# Patient Record
Sex: FEMALE | Race: OTHER | HISPANIC OR LATINO | Employment: UNEMPLOYED | URBAN - METROPOLITAN AREA
[De-identification: names, ages, dates, MRNs, and addresses within clinical notes are randomized per-mention and may not be internally consistent; named-entity substitution may affect disease eponyms.]

---

## 2023-06-05 ENCOUNTER — HOSPITAL ENCOUNTER (EMERGENCY)
Facility: HOSPITAL | Age: 53
Discharge: HOME | End: 2023-06-05
Attending: EMERGENCY MEDICINE | Admitting: EMERGENCY MEDICINE
Payer: COMMERCIAL

## 2023-06-05 ENCOUNTER — APPOINTMENT (EMERGENCY)
Dept: CT IMAGING | Facility: HOSPITAL | Age: 53
End: 2023-06-05
Payer: COMMERCIAL

## 2023-06-05 VITALS
RESPIRATION RATE: 16 BRPM | SYSTOLIC BLOOD PRESSURE: 104 MMHG | WEIGHT: 111.8 LBS | OXYGEN SATURATION: 97 % | TEMPERATURE: 98.3 F | HEART RATE: 72 BPM | DIASTOLIC BLOOD PRESSURE: 75 MMHG

## 2023-06-05 DIAGNOSIS — K62.89 PROCTITIS: ICD-10-CM

## 2023-06-05 DIAGNOSIS — K52.9 COLITIS: ICD-10-CM

## 2023-06-05 DIAGNOSIS — G89.18 POST-OPERATIVE PAIN: Primary | ICD-10-CM

## 2023-06-05 LAB
ALBUMIN SERPL BCP-MCNC: 4.1 G/DL (ref 3.5–5)
ALP SERPL-CCNC: 88 U/L (ref 34–104)
ALT SERPL W P-5'-P-CCNC: 12 U/L (ref 7–52)
ANION GAP SERPL CALCULATED.3IONS-SCNC: 11 MMOL/L (ref 4–13)
AST SERPL W P-5'-P-CCNC: 24 U/L (ref 13–39)
BACTERIA UR QL AUTO: NORMAL /HPF
BASOPHILS # BLD AUTO: 0.05 THOUSANDS/ÂΜL (ref 0–0.1)
BASOPHILS NFR BLD AUTO: 1 % (ref 0–1)
BILIRUB SERPL-MCNC: 0.33 MG/DL (ref 0.2–1)
BILIRUB UR QL STRIP: NEGATIVE
BUN SERPL-MCNC: 15 MG/DL (ref 5–25)
CALCIUM SERPL-MCNC: 9.8 MG/DL (ref 8.4–10.2)
CHLORIDE SERPL-SCNC: 105 MMOL/L (ref 96–108)
CLARITY UR: CLEAR
CO2 SERPL-SCNC: 25 MMOL/L (ref 21–32)
COLOR UR: YELLOW
CREAT SERPL-MCNC: 0.69 MG/DL (ref 0.6–1.3)
EOSINOPHIL # BLD AUTO: 0.02 THOUSAND/ÂΜL (ref 0–0.61)
EOSINOPHIL NFR BLD AUTO: 0 % (ref 0–6)
ERYTHROCYTE [DISTWIDTH] IN BLOOD BY AUTOMATED COUNT: 15 % (ref 11.6–15.1)
GFR SERPL CREATININE-BSD FRML MDRD: 100 ML/MIN/1.73SQ M
GLUCOSE SERPL-MCNC: 100 MG/DL (ref 65–140)
GLUCOSE UR STRIP-MCNC: NEGATIVE MG/DL
HCT VFR BLD AUTO: 34.9 % (ref 34.8–46.1)
HGB BLD-MCNC: 11.2 G/DL (ref 11.5–15.4)
HGB UR QL STRIP.AUTO: NEGATIVE
IMM GRANULOCYTES # BLD AUTO: 0.02 THOUSAND/UL (ref 0–0.2)
IMM GRANULOCYTES NFR BLD AUTO: 0 % (ref 0–2)
KETONES UR STRIP-MCNC: NEGATIVE MG/DL
LEUKOCYTE ESTERASE UR QL STRIP: 25
LIPASE SERPL-CCNC: 19 U/L (ref 11–82)
LYMPHOCYTES # BLD AUTO: 2.45 THOUSANDS/ÂΜL (ref 0.6–4.47)
LYMPHOCYTES NFR BLD AUTO: 32 % (ref 14–44)
MCH RBC QN AUTO: 29.6 PG (ref 26.8–34.3)
MCHC RBC AUTO-ENTMCNC: 32.1 G/DL (ref 31.4–37.4)
MCV RBC AUTO: 92 FL (ref 82–98)
MONOCYTES # BLD AUTO: 0.59 THOUSAND/ÂΜL (ref 0.17–1.22)
MONOCYTES NFR BLD AUTO: 8 % (ref 4–12)
NEUTROPHILS # BLD AUTO: 4.51 THOUSANDS/ÂΜL (ref 1.85–7.62)
NEUTS SEG NFR BLD AUTO: 59 % (ref 43–75)
NITRITE UR QL STRIP: NEGATIVE
NON-SQ EPI CELLS URNS QL MICRO: NORMAL /HPF
NRBC BLD AUTO-RTO: 0 /100 WBCS
PH UR STRIP.AUTO: 5 [PH]
PLATELET # BLD AUTO: 472 THOUSANDS/UL (ref 149–390)
PMV BLD AUTO: 9.3 FL (ref 8.9–12.7)
POTASSIUM SERPL-SCNC: 4.1 MMOL/L (ref 3.5–5.3)
PROT SERPL-MCNC: 7.8 G/DL (ref 6.4–8.4)
PROT UR STRIP-MCNC: ABNORMAL MG/DL
RBC # BLD AUTO: 3.79 MILLION/UL (ref 3.81–5.12)
RBC #/AREA URNS AUTO: NORMAL /HPF
SODIUM SERPL-SCNC: 141 MMOL/L (ref 135–147)
SP GR UR STRIP.AUTO: 1.01 (ref 1–1.04)
UROBILINOGEN UA: NEGATIVE MG/DL
WBC # BLD AUTO: 7.64 THOUSAND/UL (ref 4.31–10.16)
WBC #/AREA URNS AUTO: NORMAL /HPF

## 2023-06-05 PROCEDURE — 83690 ASSAY OF LIPASE: CPT | Performed by: EMERGENCY MEDICINE

## 2023-06-05 PROCEDURE — 74177 CT ABD & PELVIS W/CONTRAST: CPT

## 2023-06-05 PROCEDURE — 36415 COLL VENOUS BLD VENIPUNCTURE: CPT | Performed by: EMERGENCY MEDICINE

## 2023-06-05 PROCEDURE — 80053 COMPREHEN METABOLIC PANEL: CPT | Performed by: EMERGENCY MEDICINE

## 2023-06-05 PROCEDURE — G1004 CDSM NDSC: HCPCS

## 2023-06-05 PROCEDURE — 81003 URINALYSIS AUTO W/O SCOPE: CPT | Performed by: EMERGENCY MEDICINE

## 2023-06-05 PROCEDURE — 81001 URINALYSIS AUTO W/SCOPE: CPT | Performed by: EMERGENCY MEDICINE

## 2023-06-05 PROCEDURE — 85025 COMPLETE CBC W/AUTO DIFF WBC: CPT | Performed by: EMERGENCY MEDICINE

## 2023-06-05 RX ORDER — ONDANSETRON 2 MG/ML
4 INJECTION INTRAMUSCULAR; INTRAVENOUS ONCE
Status: COMPLETED | OUTPATIENT
Start: 2023-06-05 | End: 2023-06-05

## 2023-06-05 RX ORDER — IBUPROFEN 600 MG/1
600 TABLET ORAL EVERY 6 HOURS PRN
Qty: 30 TABLET | Refills: 0 | Status: SHIPPED | OUTPATIENT
Start: 2023-06-05

## 2023-06-05 RX ORDER — KETOROLAC TROMETHAMINE 30 MG/ML
15 INJECTION, SOLUTION INTRAMUSCULAR; INTRAVENOUS ONCE
Status: COMPLETED | OUTPATIENT
Start: 2023-06-05 | End: 2023-06-05

## 2023-06-05 RX ORDER — ACETAMINOPHEN 325 MG/1
650 TABLET ORAL EVERY 6 HOURS PRN
Qty: 30 TABLET | Refills: 0 | Status: SHIPPED | OUTPATIENT
Start: 2023-06-05

## 2023-06-05 RX ORDER — ACETAMINOPHEN 325 MG/1
650 TABLET ORAL ONCE
Status: COMPLETED | OUTPATIENT
Start: 2023-06-05 | End: 2023-06-05

## 2023-06-05 RX ADMIN — IOHEXOL 100 ML: 350 INJECTION, SOLUTION INTRAVENOUS at 21:10

## 2023-06-05 RX ADMIN — SODIUM CHLORIDE 1000 ML: 0.9 INJECTION, SOLUTION INTRAVENOUS at 20:45

## 2023-06-05 RX ADMIN — KETOROLAC TROMETHAMINE 15 MG: 30 INJECTION, SOLUTION INTRAMUSCULAR; INTRAVENOUS at 20:13

## 2023-06-05 RX ADMIN — ONDANSETRON 4 MG: 2 INJECTION INTRAMUSCULAR; INTRAVENOUS at 20:45

## 2023-06-05 RX ADMIN — ACETAMINOPHEN 650 MG: 325 TABLET ORAL at 23:01
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
## 2023-06-09 ENCOUNTER — HOSPITAL ENCOUNTER (EMERGENCY)
Facility: HOSPITAL | Age: 53
Discharge: HOME | End: 2023-06-09
Attending: EMERGENCY MEDICINE | Admitting: EMERGENCY MEDICINE
Payer: COMMERCIAL

## 2023-06-09 VITALS
WEIGHT: 108.03 LBS | DIASTOLIC BLOOD PRESSURE: 83 MMHG | SYSTOLIC BLOOD PRESSURE: 164 MMHG | HEART RATE: 80 BPM | RESPIRATION RATE: 18 BRPM | OXYGEN SATURATION: 97 % | TEMPERATURE: 96.4 F

## 2023-06-09 DIAGNOSIS — G89.18 POSTOPERATIVE PAIN: Primary | ICD-10-CM

## 2023-06-09 PROCEDURE — 99283 EMERGENCY DEPT VISIT LOW MDM: CPT

## 2023-06-09 NOTE — ED NOTES
Pt heard arguing with provider and at this time walked out of room yelling at provider      Sydney Horne RN  06/09/23 9950
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
## 2023-06-09 NOTE — ED NOTES
Pt eloped from exam room after provider eval  Pt ambulated from ER w/ steady gait and no apparent distress, unlabored respirations w/ clear speech     Cyndy Cullen RN  06/09/23 7077

## 2023-09-08 ENCOUNTER — APPOINTMENT (EMERGENCY)
Dept: RADIOLOGY | Facility: HOSPITAL | Age: 53
End: 2023-09-08
Payer: COMMERCIAL

## 2023-09-08 ENCOUNTER — HOSPITAL ENCOUNTER (EMERGENCY)
Facility: HOSPITAL | Age: 53
Discharge: HOME | End: 2023-09-08
Attending: STUDENT IN AN ORGANIZED HEALTH CARE EDUCATION/TRAINING PROGRAM
Payer: COMMERCIAL

## 2023-09-08 VITALS
RESPIRATION RATE: 22 BRPM | TEMPERATURE: 98.5 F | OXYGEN SATURATION: 95 % | SYSTOLIC BLOOD PRESSURE: 157 MMHG | HEART RATE: 99 BPM | DIASTOLIC BLOOD PRESSURE: 90 MMHG

## 2023-09-08 DIAGNOSIS — Z00.8 MEDICAL CLEARANCE FOR INCARCERATION: Primary | ICD-10-CM

## 2023-09-08 PROCEDURE — 99284 EMERGENCY DEPT VISIT MOD MDM: CPT | Performed by: STUDENT IN AN ORGANIZED HEALTH CARE EDUCATION/TRAINING PROGRAM

## 2023-09-08 PROCEDURE — 74022 RADEX COMPL AQT ABD SERIES: CPT

## 2023-09-08 PROCEDURE — 99283 EMERGENCY DEPT VISIT LOW MDM: CPT

## 2023-09-08 NOTE — ED PROVIDER NOTES
History  Chief Complaint   Patient presents with   • Medical Problem     Pt arrived with corrections officers for medical clearance. Pt was given body scan in [facility] and further testing needs to be performed. Pt c/o chronic lower back pain. HPI     47 yr old female, past medical history of bipolar disorder, presenting to the ED from [address] with correctional officers for foreign body evaluation. Patient handcuffed in stretcher. Per correctional officers, patient allegedly may have put 2 ring objects in her vagina. Says there is " only soft tissue you guys are going to look dumb". Denies any ingestions or foreign body insertion. Patient is talking to herself, disorganized, intermittently verbally aggressive but redirectable. She adamantly refuses pelvic exam.      Prior to Admission Medications   Prescriptions Last Dose Informant Patient Reported? Taking?   acetaminophen (TYLENOL) 325 mg tablet   No No   Sig: Take 2 tablets (650 mg total) by mouth every 6 (six) hours as needed for mild pain   ibuprofen (MOTRIN) 600 mg tablet   No No   Sig: Take 1 tablet (600 mg total) by mouth every 6 (six) hours as needed for moderate pain      Facility-Administered Medications: None       Past Medical History:   Diagnosis Date   • Anxiety    • Depression        History reviewed. No pertinent surgical history. History reviewed. No pertinent family history. I have reviewed and agree with the history as documented. E-Cigarette/Vaping     E-Cigarette/Vaping Substances   • Nicotine No    • Flavoring No      Social History     Tobacco Use   • Smoking status: Every Day     Packs/day: 0.50     Types: Cigarettes   • Smokeless tobacco: Never   Substance Use Topics   • Alcohol use: Not Currently   • Drug use: Yes     Types: Marijuana       Review of Systems    Physical Exam  Physical Exam  Vitals and nursing note reviewed. Constitutional:       General: She is not in acute distress.      Appearance: She is not ill-appearing, toxic-appearing or diaphoretic. HENT:      Head: Normocephalic. Cardiovascular:      Rate and Rhythm: Normal rate and regular rhythm. Pulmonary:      Effort: No respiratory distress. Abdominal:      General: There is no distension. Palpations: Abdomen is soft. Tenderness: There is no abdominal tenderness. Comments: Midline surgical scar   Skin:     General: Skin is warm and dry. Neurological:      Mental Status: She is alert. Psychiatric:         Attention and Perception: She is inattentive. Behavior: Behavior is agitated. Thought Content: Thought content is paranoid. Vital Signs  ED Triage Vitals [09/08/23 1629]   Temperature Pulse Respirations Blood Pressure SpO2   98.5 °F (36.9 °C) 99 22 157/90 95 %      Temp Source Heart Rate Source Patient Position - Orthostatic VS BP Location FiO2 (%)   Oral Monitor Sitting Left arm --      Pain Score       --           Vitals:    09/08/23 1629   BP: 157/90   Pulse: 99   Patient Position - Orthostatic VS: Sitting         Visual Acuity      ED Medications  Medications - No data to display    Diagnostic Studies  Results Reviewed     None                 XR abdomen obstruction series   ED Interpretation by Beverely Claude, DO (09/08 1807)   Possible radiopaque foreign body visualized      Final Result by Jenifer Salcedo MD (09/08 1911)   No radiopaque foreign body visualized. Nonobstructive bowel gas pattern. Workstation performed: [id number]                    Procedures  Procedures         ED Course  ED Course as of 09/08/23 1925   Demetris Lincoln Sep 08, 2023   1916 XR abdomen obstruction series  IMPRESSION:  No radiopaque foreign body visualized. Nonobstructive bowel gas pattern. Medical Decision Making  47 yr old female, past medical history of bipolar disorder, presenting to the ED from [address] [facility] with correctional officers for evaluation of possible foreign body.   Per correctional officers, there may be 2 rings in patient's vagina. Patient refuses pelvic examination,  at the [facility] says patient can refuse pelvic examination and is not necessary; but requires imaging ensure there are no dangerous objects that can be visualized in the patient. Obstruction x-ray series was ordered. On exam, abdomen soft, midline surgical scar present. Obstruction XR by final radiology report demonstrated no radiopaque foreign bodies in the abdomen or pelvis. Provided x-ray report to the correctional officers. At this time, patient medically cleared for discharge. Discussed with patient and correctional officers if any new concerns arise can return to the emergency department for further evaluation. Amount and/or Complexity of Data Reviewed  Radiology: ordered and independent interpretation performed. Decision-making details documented in ED Course. Disposition  Final diagnoses:   Medical clearance for incarceration     Time reflects when diagnosis was documented in both MDM as applicable and the Disposition within this note     Time User Action Codes Description Comment    9/8/2023  7:17 PM Ashia Speak Add [Z00.8] Medical clearance for incarceration       ED Disposition     ED Disposition   Discharge    Condition   Stable    Date/Time   Fri Sep 8, 2023  7:16 PM    Comment   Verlan Epley discharge to home/self care. Follow-up Information     Follow up With Specialties Details Why Contact Info Additional [address] [facility] Emergency Department Emergency Medicine Go to  If symptoms worsen, As needed [address] [id number] [address] [facility] Emergency Department          Patient's Medications   Discharge Prescriptions    No medications on file       No discharge procedures on file.     PDMP Review       Value Time User    PDMP Reviewed  Yes 6/5/2023 10:41 PM Willian León Lennox Conrad, MD          ED Provider  Electronically Signed by Trish Rios DO  09/08/23 1920

## 2023-09-08 NOTE — DISCHARGE INSTRUCTIONS
The radiologist did not visualize any radiopaque foreign body in the chest, abdomen or pelvis. The final radiology report read:     "OBSTRUCTION SERIES     INDICATION:   possible ingestion. COMPARISON:  None     EXAM PERFORMED/VIEWS:  XR ABDOMEN OBSTRUCTION SERIES     FINDINGS:     There is a nonobstructive bowel gas pattern. Rectal sutures. No free air beneath the hemidiaphragms. No pathologic calcifications or soft tissue masses evident. No radiopaque foreign body. Osseous structures are unremarkable. Lower lumbar laminectomy. Examination of the chest reveals a normal cardiomediastinal silhouette. Lungs are clear. IMPRESSION:  No radiopaque foreign body visualized.   Nonobstructive bowel gas pattern."

## 2025-01-20 ENCOUNTER — HOSPITAL ENCOUNTER (EMERGENCY)
Facility: HOSPITAL | Age: 55
Discharge: HOME | End: 2025-01-20
Attending: EMERGENCY MEDICINE
Payer: COMMERCIAL

## 2025-01-20 VITALS
RESPIRATION RATE: 22 BRPM | HEART RATE: 86 BPM | TEMPERATURE: 97.8 F | OXYGEN SATURATION: 95 % | DIASTOLIC BLOOD PRESSURE: 58 MMHG | SYSTOLIC BLOOD PRESSURE: 154 MMHG

## 2025-01-20 DIAGNOSIS — J06.9 UPPER RESPIRATORY TRACT INFECTION, UNSPECIFIED TYPE: Primary | ICD-10-CM

## 2025-01-20 DIAGNOSIS — J45.20 MILD INTERMITTENT ASTHMA, UNSPECIFIED WHETHER COMPLICATED: ICD-10-CM

## 2025-01-20 LAB
FLUAV AG UPPER RESP QL IA.RAPID: NEGATIVE
FLUBV AG UPPER RESP QL IA.RAPID: NEGATIVE
SARS-COV+SARS-COV-2 AG RESP QL IA.RAPID: NEGATIVE

## 2025-01-20 PROCEDURE — 99284 EMERGENCY DEPT VISIT MOD MDM: CPT

## 2025-01-20 PROCEDURE — 94640 AIRWAY INHALATION TREATMENT: CPT

## 2025-01-20 PROCEDURE — 99284 EMERGENCY DEPT VISIT MOD MDM: CPT | Performed by: EMERGENCY MEDICINE

## 2025-01-20 PROCEDURE — 87804 INFLUENZA ASSAY W/OPTIC: CPT | Performed by: EMERGENCY MEDICINE

## 2025-01-20 PROCEDURE — 87811 SARS-COV-2 COVID19 W/OPTIC: CPT | Performed by: EMERGENCY MEDICINE

## 2025-01-20 RX ORDER — BENZONATATE 100 MG/1
100 CAPSULE ORAL 3 TIMES DAILY PRN
Qty: 10 CAPSULE | Refills: 0 | Status: SHIPPED | OUTPATIENT
Start: 2025-01-20

## 2025-01-20 RX ORDER — ALBUTEROL SULFATE 90 UG/1
2 INHALANT RESPIRATORY (INHALATION) ONCE
Status: COMPLETED | OUTPATIENT
Start: 2025-01-20 | End: 2025-01-20

## 2025-01-20 RX ORDER — GUAIFENESIN 600 MG/1
600 TABLET, EXTENDED RELEASE ORAL ONCE
Status: DISCONTINUED | OUTPATIENT
Start: 2025-01-20 | End: 2025-01-20

## 2025-01-20 RX ORDER — FLUTICASONE PROPIONATE 50 MCG
1 SPRAY, SUSPENSION (ML) NASAL DAILY
Qty: 16 G | Refills: 0 | Status: SHIPPED | OUTPATIENT
Start: 2025-01-20

## 2025-01-20 RX ORDER — IPRATROPIUM BROMIDE AND ALBUTEROL SULFATE 2.5; .5 MG/3ML; MG/3ML
3 SOLUTION RESPIRATORY (INHALATION) ONCE
Status: COMPLETED | OUTPATIENT
Start: 2025-01-20 | End: 2025-01-20

## 2025-01-20 RX ORDER — L-DESOXYEPHEDRINE 50 MG
INHALER (EA) NASAL
Qty: 50 G | Refills: 0 | Status: SHIPPED | OUTPATIENT
Start: 2025-01-20

## 2025-01-20 RX ORDER — GUAIFENESIN 600 MG/1
600 TABLET, EXTENDED RELEASE ORAL EVERY 12 HOURS SCHEDULED
Qty: 6 TABLET | Refills: 0 | Status: SHIPPED | OUTPATIENT
Start: 2025-01-20 | End: 2025-01-22 | Stop reason: SDUPTHER

## 2025-01-20 RX ORDER — GUAIFENESIN 600 MG/1
1200 TABLET, EXTENDED RELEASE ORAL ONCE
Status: COMPLETED | OUTPATIENT
Start: 2025-01-20 | End: 2025-01-20

## 2025-01-20 RX ORDER — PREDNISONE 10 MG/1
50 TABLET ORAL DAILY
Qty: 25 TABLET | Refills: 0 | Status: SHIPPED | OUTPATIENT
Start: 2025-01-20 | End: 2025-01-25

## 2025-01-20 RX ORDER — ALBUTEROL SULFATE 90 UG/1
2 INHALANT RESPIRATORY (INHALATION) EVERY 4 HOURS PRN
Qty: 8 G | Refills: 0 | Status: SHIPPED | OUTPATIENT
Start: 2025-01-20 | End: 2025-01-22 | Stop reason: SDUPTHER

## 2025-01-20 RX ADMIN — IPRATROPIUM BROMIDE AND ALBUTEROL SULFATE 3 ML: 2.5; .5 SOLUTION RESPIRATORY (INHALATION) at 04:46

## 2025-01-20 RX ADMIN — PREDNISONE 50 MG: 20 TABLET ORAL at 04:47

## 2025-01-20 RX ADMIN — ALBUTEROL SULFATE 2 PUFF: 90 AEROSOL, METERED RESPIRATORY (INHALATION) at 06:04

## 2025-01-20 RX ADMIN — IPRATROPIUM BROMIDE AND ALBUTEROL SULFATE 3 ML: 2.5; .5 SOLUTION RESPIRATORY (INHALATION) at 05:42

## 2025-01-20 RX ADMIN — GUAIFENESIN 1200 MG: 600 TABLET ORAL at 05:26

## 2025-01-20 NOTE — ED PROVIDER NOTES
Time reflects when diagnosis was documented in both MDM as applicable and the Disposition within this note       Time User Action Codes Description Comment    1/20/2025  5:53 AM Maninder Aguilar [J06.9] Upper respiratory tract infection, unspecified type     1/20/2025  5:53 AM Maninder Aguilar [J45.20] Mild intermittent asthma, unspecified whether complicated           ED Disposition       ED Disposition   Discharge    Condition   Stable    Date/Time   Mon Jan 20, 2025  5:53 AM    Comment   Jackie Jorgensenbryson discharge to home/self care.                   Assessment & Plan       Medical Decision Making  Amount and/or Complexity of Data Reviewed  Labs: ordered. Decision-making details documented in ED Course.    Risk  OTC drugs.  Prescription drug management.      Amount and/or Complexity of Data Reviewed  Clinical lab tests: ordered covid flu rsv   Reviewed past medical records: yes     History Provided by patient    Differential considered covid flu rsv viral uri, asthma exacerbation    Patient given duoneb, started steroids, with resolution of symptoms, will defer inpatient admission at this time. Resting comfortably, speaking in full sentences, no signs of respiratory distress. Covid flu negative. Prescribed symptomatic treatments, stable for outpatient follow up.    The patient was instructed to follow up as documented. Strict return precautions were discussed with the patient and the patient was instructed to return to the emergency department immediately if symptoms worsen. The patient/patient family member acknowledged and were in agreement with plan.     ED Course as of 01/20/25 0645   Mon Jan 20, 2025   0539 SARS COV Rapid Antigen: Negative   0539 Influenza A Rapid Antigen: Negative   0539 Influenza B Rapid Antigen: Negative       Medications   ipratropium-albuterol (DUO-NEB) 0.5-2.5 mg/3 mL inhalation solution 3 mL (3 mL Nebulization Given 1/20/25 0446)   predniSONE tablet 50 mg (50 mg Oral Given 1/20/25 0447)    guaiFENesin (MUCINEX) 12 hr tablet 1,200 mg (1,200 mg Oral Given 1/20/25 0560)   ipratropium-albuterol (DUO-NEB) 0.5-2.5 mg/3 mL inhalation solution 3 mL (3 mL Nebulization Given 1/20/25 0542)   albuterol (PROVENTIL HFA,VENTOLIN HFA) inhaler 2 puff (2 puffs Inhalation Given 1/20/25 0604)       ED Risk Strat Scores                                              History of Present Illness       Chief Complaint   Patient presents with    Asthma     Pt reports cough and congestion x1 week, was taking dayquil but ran out. Pt reports some SOB and low grade fever earlier today, no meds PTA.      55 yo F hx of mild intermittent asthma, chronic low back pain, presents to ed for sob and cold symptoms.  Duration of symptoms: 1 week, congestion  Patient denies prior intubations or hospitalizations for asthma.   Speaking in full sentences without difficulty.   + cough no fever.   Denies chest pain.  Smoking: denies  Last steroid use: unknown  Next pulmonologist appt: known    Mom has similar symptoms. Did not get covid or flu shot.     Past Medical History:   Diagnosis Date    Anxiety     Asthma     Depression       History reviewed. No pertinent surgical history.   History reviewed. No pertinent family history.   Social History     Tobacco Use    Smoking status: Some Days     Current packs/day: 0.50     Types: Cigarettes    Smokeless tobacco: Never   Substance Use Topics    Alcohol use: Not Currently    Drug use: Yes     Types: Marijuana      E-Cigarette/Vaping      E-Cigarette/Vaping Substances    Nicotine No     Flavoring No       I have reviewed and agree with the history as documented.     HPI    Review of Systems   Constitutional:  Negative for chills, fatigue and fever.   HENT:  Positive for congestion. Negative for sore throat.    Eyes:  Negative for redness and visual disturbance.   Respiratory:  Positive for cough, shortness of breath and wheezing.    Cardiovascular:  Negative for chest pain.   Gastrointestinal:   Negative for abdominal pain, diarrhea and nausea.   Genitourinary:  Negative for difficulty urinating, dysuria and pelvic pain.   Musculoskeletal:  Negative for back pain.   Skin:  Negative for rash.   Neurological:  Negative for syncope, weakness and headaches.   All other systems reviewed and are negative.          Objective       ED Triage Vitals [01/20/25 0426]   Temperature Pulse Blood Pressure Respirations SpO2 Patient Position - Orthostatic VS   97.8 °F (36.6 °C) 86 154/58 22 95 % Sitting      Temp Source Heart Rate Source BP Location FiO2 (%) Pain Score    Tympanic Monitor Left arm -- --      Vitals      Date and Time Temp Pulse SpO2 Resp BP Pain Score FACES Pain Rating User   01/20/25 0426 97.8 °F (36.6 °C) 86 95 % 22 154/58 -- -- SD            Physical Exam  Vitals and nursing note reviewed.   Constitutional:       General: She is not in acute distress.  HENT:      Head: Normocephalic and atraumatic.      Right Ear: External ear normal.      Left Ear: External ear normal.   Eyes:      Extraocular Movements: Extraocular movements intact.      Conjunctiva/sclera: Conjunctivae normal.   Cardiovascular:      Rate and Rhythm: Normal rate and regular rhythm.      Heart sounds: Normal heart sounds.   Pulmonary:      Effort: Pulmonary effort is normal. No respiratory distress.      Breath sounds: Normal breath sounds.      Comments: Minimal exp wheezing b/l  Abdominal:      General: Abdomen is flat.      Tenderness: There is no abdominal tenderness.   Musculoskeletal:         General: Normal range of motion.      Cervical back: Normal range of motion.   Skin:     General: Skin is warm and dry.   Neurological:      Mental Status: She is alert and oriented to person, place, and time.      Cranial Nerves: No cranial nerve deficit.      Motor: No abnormal muscle tone.      Coordination: Coordination normal.         Results Reviewed       Procedure Component Value Units Date/Time    FLU/COVID Rapid Antigen (30 min.  TAT) - Preferred screening test in ED [[id number]]  (Normal) Collected: 01/20/25 0521    Lab Status: Final result Specimen: Nares from Nose Updated: 01/20/25 0522     SARS COV Rapid Antigen Negative     Influenza A Rapid Antigen Negative     Influenza B Rapid Antigen Negative    Narrative:      This test has been performed using the [employer] Margarita 2 FLU+SARS Antigen test under the Emergency Use Authorization (EUA). This test has been validated by the  and verified by the performing laboratory. The Margarita uses lateral flow immunofluorescent sandwich assay to detect SARS-COV, Influenza A and Influenza B Antigen.     The Quidel Margarita 2 SARS Antigen test does not differentiate between SARS-CoV and SARS-CoV-2.     Negative results are presumptive and may be confirmed with a molecular assay, if necessary, for patient management. Negative results do not rule out SARS-CoV-2 or influenza infection and should not be used as the sole basis for treatment or patient management decisions. A negative test result may occur if the level of antigen in a sample is below the limit of detection of this test.     Positive results are indicative of the presence of viral antigens, but do not rule out bacterial infection or co-infection with other viruses.     All test results should be used as an adjunct to clinical observations and other information available to the provider.    FOR PEDIATRIC PATIENTS - copy/paste COVID Guidelines URL to browser: [email or web address]            No orders to display       Procedures    ED Medication and Procedure Management   Prior to Admission Medications   Prescriptions Last Dose Informant Patient Reported? Taking?   acetaminophen (TYLENOL) 325 mg tablet   No No   Sig: Take 2 tablets (650 mg total) by mouth every 6 (six) hours as needed for mild pain   ibuprofen (MOTRIN) 600 mg tablet   No No   Sig: Take 1 tablet (600 mg total) by mouth every  6 (six) hours as needed for moderate pain      Facility-Administered Medications: None     Discharge Medication List as of 1/20/2025  5:55 AM        START taking these medications    Details   albuterol (Proventil HFA) 90 mcg/act inhaler Inhale 2 puffs every 4 (four) hours as needed for wheezing, Starting Mon 1/20/2025, Normal      benzonatate (TESSALON PERLES) 100 mg capsule Take 1 capsule (100 mg total) by mouth 3 (three) times a day as needed for cough, Starting Mon 1/20/2025, Normal      Camphor-Eucalyptus-Menthol (Vicks VapoRub) 4.7-1.2-2.6 % OINT Apply topically daily at bedtime as needed (congestion) for up to 1 dose, Starting Mon 1/20/2025, Normal      fluticasone (FLONASE) 50 mcg/act nasal spray 1 spray into each nostril daily, Starting Mon 1/20/2025, Normal      guaiFENesin (MUCINEX) 600 mg 12 hr tablet Take 1 tablet (600 mg total) by mouth every 12 (twelve) hours for 3 days, Starting Mon 1/20/2025, Until Thu 1/23/2025, Normal      predniSONE 10 mg tablet Take 5 tablets (50 mg total) by mouth daily for 5 days, Starting Mon 1/20/2025, Until Sat 1/25/2025, Normal           CONTINUE these medications which have NOT CHANGED    Details   acetaminophen (TYLENOL) 325 mg tablet Take 2 tablets (650 mg total) by mouth every 6 (six) hours as needed for mild pain, Starting Mon 6/5/2023, Normal      ibuprofen (MOTRIN) 600 mg tablet Take 1 tablet (600 mg total) by mouth every 6 (six) hours as needed for moderate pain, Starting Mon 6/5/2023, Normal           No discharge procedures on file.  ED SEPSIS DOCUMENTATION   Time reflects when diagnosis was documented in both MDM as applicable and the Disposition within this note       Time User Action Codes Description Comment    1/20/2025  5:53 AM Maninder Aguilar [J06.9] Upper respiratory tract infection, unspecified type     1/20/2025  5:53 AM Maninder Aguilar [J45.20] Mild intermittent asthma, unspecified whether complicated                  Maninder Aguilar MD  01/20/25  [id number]

## 2025-01-21 ENCOUNTER — HOSPITAL ENCOUNTER (EMERGENCY)
Facility: HOSPITAL | Age: 55
Discharge: HOME | End: 2025-01-21
Attending: EMERGENCY MEDICINE | Admitting: EMERGENCY MEDICINE
Payer: COMMERCIAL

## 2025-01-21 ENCOUNTER — PATIENT OUTREACH (OUTPATIENT)
Dept: OTHER | Facility: OTHER | Age: 55
End: 2025-01-21

## 2025-01-21 ENCOUNTER — APPOINTMENT (EMERGENCY)
Dept: RADIOLOGY | Facility: HOSPITAL | Age: 55
End: 2025-01-21
Payer: COMMERCIAL

## 2025-01-21 ENCOUNTER — DOCUMENTATION (OUTPATIENT)
Dept: OTHER | Facility: HOSPITAL | Age: 55
End: 2025-01-21

## 2025-01-21 VITALS
TEMPERATURE: 97.9 F | RESPIRATION RATE: 22 BRPM | WEIGHT: 120 LBS | OXYGEN SATURATION: 95 % | DIASTOLIC BLOOD PRESSURE: 92 MMHG | HEART RATE: 87 BPM | SYSTOLIC BLOOD PRESSURE: 137 MMHG

## 2025-01-21 DIAGNOSIS — J06.9 URI (UPPER RESPIRATORY INFECTION): Primary | ICD-10-CM

## 2025-01-21 DIAGNOSIS — J45.20 MILD INTERMITTENT ASTHMA, UNSPECIFIED WHETHER COMPLICATED: ICD-10-CM

## 2025-01-21 DIAGNOSIS — J40 BRONCHITIS: Primary | ICD-10-CM

## 2025-01-21 PROCEDURE — 71045 X-RAY EXAM CHEST 1 VIEW: CPT

## 2025-01-21 PROCEDURE — 99285 EMERGENCY DEPT VISIT HI MDM: CPT

## 2025-01-21 PROCEDURE — 99284 EMERGENCY DEPT VISIT MOD MDM: CPT

## 2025-01-21 RX ORDER — AZITHROMYCIN 250 MG/1
TABLET, FILM COATED ORAL
Qty: 6 TABLET | Refills: 0 | Status: SHIPPED | OUTPATIENT
Start: 2025-01-21 | End: 2025-01-22 | Stop reason: SDUPTHER

## 2025-01-21 RX ORDER — GUAIFENESIN 600 MG/1
1200 TABLET, EXTENDED RELEASE ORAL ONCE
Status: COMPLETED | OUTPATIENT
Start: 2025-01-21 | End: 2025-01-21

## 2025-01-21 RX ORDER — ALBUTEROL SULFATE 90 UG/1
2 INHALANT RESPIRATORY (INHALATION) EVERY 6 HOURS PRN
Qty: 18 G | Refills: 0 | Status: SHIPPED | OUTPATIENT
Start: 2025-01-21

## 2025-01-21 RX ORDER — ALBUTEROL SULFATE 0.83 MG/ML
2.5 SOLUTION RESPIRATORY (INHALATION) EVERY 6 HOURS PRN
Qty: 60 ML | Refills: 0 | Status: SHIPPED | OUTPATIENT
Start: 2025-01-21 | End: 2025-01-22 | Stop reason: SDUPTHER

## 2025-01-21 RX ADMIN — GUAIFENESIN 1200 MG: 600 TABLET ORAL at 03:34

## 2025-01-21 NOTE — ED PROVIDER NOTES
Time reflects when diagnosis was documented in both MDM as applicable and the Disposition within this note       Time User Action Codes Description Comment    1/21/2025  3:59 AM Anastacia Orellana Add [J06.9] URI (upper respiratory infection)           ED Disposition       ED Disposition   Discharge    Condition   Stable    Date/Time   Tue Jan 21, 2025  3:59 AM    Comment   Jackie Rose discharge to home/self care.                   Assessment & Plan         Medical Decision Making  This is the patient's second visit in 24 hours due to URI symptoms or shortness of breath.  On arrival she is hemodynamically stable and in no acute respiratory distress.  Differential diagnosis includes but is not limited to viral syndrome, URI, bronchitis, pneumonia, asthma exacerbation, pleural effusion, or pneumothorax.  Viral swab done yesterday was negative for covid and influenza.  Today's chest x-ray revealed no evidence of a bacterial pneumonia.  Reviewed supportive care measures and all patient's questions were answered.  Return precautions discussed and verbalized understanding.  Follow-up with PCP, but return to the ED in the interim with new or worsening symptoms.    Amount and/or Complexity of Data Reviewed  Radiology: ordered and independent interpretation performed.    Risk  OTC drugs.         Medications   guaiFENesin (MUCINEX) 12 hr tablet 1,200 mg (1,200 mg Oral Given 1/21/25 0334)       ED Risk Strat Scores        SBIRT 20yo+      Flowsheet Row Most Recent Value   Initial Alcohol Screen: US AUDIT-C     1. How often do you have a drink containing alcohol? 0 Filed at: 01/21/2025 0309   2. How many drinks containing alcohol do you have on a typical day you are drinking?  0 Filed at: 01/21/2025 0309   3b. FEMALE Any Age, or MALE 65+: How often do you have 4 or more drinks on one occassion? 0 Filed at: 01/21/2025 0309   Audit-C Score 0 Filed at: 01/21/2025 0309   CHARLES: How many times in the past year have you...    Used an  illegal drug or used a prescription medication for non-medical reasons? Never Filed at: 01/21/2025 0309              History of Present Illness       Chief Complaint   Patient presents with    Shortness of Breath     Pt was seen yesterday for the same, pt states her symptoms are still the same and she was not able to  the medication they prescribed her yesterday due to it being out of stock at the pharmacy. Pt states she's been using her inhaler without relief.        Past Medical History:   Diagnosis Date    Anxiety     Asthma     Depression       History reviewed. No pertinent surgical history.   History reviewed. No pertinent family history.   Social History     Tobacco Use    Smoking status: Some Days     Current packs/day: 0.50     Types: Cigarettes    Smokeless tobacco: Never   Substance Use Topics    Alcohol use: Not Currently    Drug use: Yes     Types: Marijuana      E-Cigarette/Vaping      E-Cigarette/Vaping Substances    Nicotine No     Flavoring No       I have reviewed and agree with the history as documented.     The patient is a 54 y.o. female with a past medical history of asthma, chronic low back pain, and bipolar disorder, who presents for re-evaluation of shortness of breath.  She reports one week of cough, congestion, wheezing, shortness of breath, and a low grade fever.  Viral swab was negative for covid and influenza.  Patient was subsequently prescribed multiple medications for symptom relief.  Tonight patient reports continued nasal congestion, a productive cough, and chest tightness.  She has used her albuterol inhaler twice without relief.  Per patient she picked up the prednisone from the pharmacy, but did not  any of the other medications.  No fever or chills tonight.          Review of Systems   Constitutional:  Negative for chills and fever.   HENT:  Positive for congestion. Negative for ear pain, rhinorrhea and sore throat.    Eyes:  Negative for pain and visual  disturbance.   Respiratory:  Positive for cough, chest tightness, shortness of breath and wheezing.    Cardiovascular:  Negative for chest pain and palpitations.   Gastrointestinal:  Negative for abdominal pain, diarrhea, nausea and vomiting.   Genitourinary:  Negative for dysuria and hematuria.   Musculoskeletal:  Negative for arthralgias, back pain and myalgias.   Skin:  Negative for color change and rash.   Neurological:  Negative for seizures, syncope and headaches.   All other systems reviewed and are negative.      Objective       ED Triage Vitals [01/21/25 0308]   Temperature Pulse Blood Pressure Respirations SpO2 Patient Position - Orthostatic VS   97.9 °F (36.6 °C) 87 137/92 22 95 % Lying      Temp Source Heart Rate Source BP Location FiO2 (%) Pain Score    Tympanic Monitor Left arm -- --      Vitals      Date and Time Temp Pulse SpO2 Resp BP Pain Score FACES Pain Rating User   01/21/25 0308 97.9 °F (36.6 °C) 87 95 % 22 137/92 -- -- LENARD            Physical Exam  Vitals and nursing note reviewed.   Constitutional:       General: She is awake. She is not in acute distress.     Appearance: She is well-developed and normal weight. She is not toxic-appearing or diaphoretic.   HENT:      Head: Normocephalic and atraumatic.      Right Ear: External ear normal.      Left Ear: External ear normal.      Nose: Mucosal edema and congestion present. No rhinorrhea.      Mouth/Throat:      Lips: Pink.      Mouth: Mucous membranes are moist.      Pharynx: Oropharynx is clear. Uvula midline.   Eyes:      General: Lids are normal. Gaze aligned appropriately.      Conjunctiva/sclera: Conjunctivae normal.      Pupils: Pupils are equal, round, and reactive to light.   Cardiovascular:      Rate and Rhythm: Normal rate and regular rhythm.      Heart sounds: S1 normal and S2 normal. No murmur heard.     No friction rub. No gallop.   Pulmonary:      Effort: Pulmonary effort is normal. No tachypnea, accessory muscle usage,  respiratory distress or retractions.      Breath sounds: Normal air entry. No decreased air movement. Examination of the right-lower field reveals rales. Examination of the left-lower field reveals rales. Rales present. No wheezing or rhonchi.      Comments: Patient is speaking in full sentences without tachypnea or increased work of breathing.  Auscultation revealed rales at the bases that cleared after coughing.  No decreased air movement or wheezes noted.  Abdominal:      General: Abdomen is flat.      Palpations: Abdomen is soft.      Tenderness: There is no abdominal tenderness. There is no guarding or rebound.   Musculoskeletal:      Cervical back: Normal, full passive range of motion without pain and neck supple. No rigidity or crepitus. No spinous process tenderness or muscular tenderness.      Thoracic back: Normal. No spasms, tenderness or bony tenderness.      Lumbar back: Normal. No spasms, tenderness or bony tenderness.   Lymphadenopathy:      Head:      Right side of head: No submental, submandibular, tonsillar, preauricular or posterior auricular adenopathy.      Left side of head: No submental, submandibular, tonsillar, preauricular or posterior auricular adenopathy.      Cervical: No cervical adenopathy.   Skin:     General: Skin is warm and dry.      Capillary Refill: Capillary refill takes less than 2 seconds.      Coloration: Skin is not pale.      Findings: No rash.   Neurological:      Mental Status: She is alert and oriented to person, place, and time.   Psychiatric:         Attention and Perception: Attention normal.         Mood and Affect: Mood normal.         Speech: Speech normal.         Behavior: Behavior normal. Behavior is cooperative.         Results Reviewed       None            XR chest 1 view portable   ED Interpretation by Anastacia Orellana PA-C (01/21 7982)   No acute cardiopulmonary disease          Procedures    ED Medication and Procedure Management   Prior to Admission  Medications   Prescriptions Last Dose Informant Patient Reported? Taking?   Camphor-Eucalyptus-Menthol (Vicks VapoRub) 4.7-1.2-2.6 % OINT   No No   Sig: Apply topically daily at bedtime as needed (congestion) for up to 1 dose   acetaminophen (TYLENOL) 325 mg tablet   No No   Sig: Take 2 tablets (650 mg total) by mouth every 6 (six) hours as needed for mild pain   albuterol (Proventil HFA) 90 mcg/act inhaler   No No   Sig: Inhale 2 puffs every 4 (four) hours as needed for wheezing   benzonatate (TESSALON PERLES) 100 mg capsule   No No   Sig: Take 1 capsule (100 mg total) by mouth 3 (three) times a day as needed for cough   fluticasone (FLONASE) 50 mcg/act nasal spray   No No   Si spray into each nostril daily   guaiFENesin (MUCINEX) 600 mg 12 hr tablet   No No   Sig: Take 1 tablet (600 mg total) by mouth every 12 (twelve) hours for 3 days   ibuprofen (MOTRIN) 600 mg tablet   No No   Sig: Take 1 tablet (600 mg total) by mouth every 6 (six) hours as needed for moderate pain   predniSONE 10 mg tablet   No No   Sig: Take 5 tablets (50 mg total) by mouth daily for 5 days      Facility-Administered Medications: None     Discharge Medication List as of 2025  4:00 AM        CONTINUE these medications which have NOT CHANGED    Details   acetaminophen (TYLENOL) 325 mg tablet Take 2 tablets (650 mg total) by mouth every 6 (six) hours as needed for mild pain, Starting Mon 2023, Normal      albuterol (Proventil HFA) 90 mcg/act inhaler Inhale 2 puffs every 4 (four) hours as needed for wheezing, Starting 2025, Normal      benzonatate (TESSALON PERLES) 100 mg capsule Take 1 capsule (100 mg total) by mouth 3 (three) times a day as needed for cough, Starting 2025, Normal      Camphor-Eucalyptus-Menthol (Vicks VapoRub) 4.7-1.2-2.6 % OINT Apply topically daily at bedtime as needed (congestion) for up to 1 dose, Starting 2025, Normal      fluticasone (FLONASE) 50 mcg/act nasal spray 1 spray into  each nostril daily, Starting Mon 1/20/2025, Normal      guaiFENesin (MUCINEX) 600 mg 12 hr tablet Take 1 tablet (600 mg total) by mouth every 12 (twelve) hours for 3 days, Starting Mon 1/20/2025, Until Thu 1/23/2025, Normal      ibuprofen (MOTRIN) 600 mg tablet Take 1 tablet (600 mg total) by mouth every 6 (six) hours as needed for moderate pain, Starting Mon 6/5/2023, Normal      predniSONE 10 mg tablet Take 5 tablets (50 mg total) by mouth daily for 5 days, Starting Mon 1/20/2025, Until Sat 1/25/2025, Normal           No discharge procedures on file.  ED SEPSIS DOCUMENTATION   Time reflects when diagnosis was documented in both MDM as applicable and the Disposition within this note       Time User Action Codes Description Comment    1/21/2025  3:59 AM Anastacia Orellana Add [J06.9] URI (upper respiratory infection)                  Anastacia Orellana PA-C  01/21/25 0615

## 2025-01-21 NOTE — PROGRESS NOTES
Pt presented to the [facility] at the [facility]    Pt notes she recently moved to [facility] from [place] and is staying with her mother    PMH  Asthma  Bipolar  Depression/anxiety    PSH  Back surgery:  bone removed from nerve compressoin    Soc hx  +tob:  1 cig qd, but vapes/uses hookah    Developed sob/cough, nasal congestion, rhinorrhea on 1/8.  Did not improve and presented to the ER 2d ago and was not able to get Rx filled.  Re-presented to ER yesterday.  Was able to get prednisone and benzonate filled.  Was not able to get guaifenesin or flonase.    Pt notes worsening cough, worse at night.  Notes nasal congestion, PND.  No f/c.  No n/v.    PE:  127/92 Pox 100% P80  Gen: pleasant female.  Nad.  Nontachypnic  Heart; RRR no m/r/g  Lungs; bilat rhonchi. +end exp wheezing.  No crackles.  Neuro: awake. Alert.  Fluent speech.  Strength intact    A/p  1-bronchitis:  viral vs bacterial: with diffuse rhonchi/sob  -will Rx Z pack for presumed concurrent copd exacerbation  -refill albuterol MDI  -albuterol nebulizer solution and neb machine    2- asthma/possible copd with exacerbatoin  -albuterol neb  -albuterol MDI  -z pack  -cont prednisone from ER yesterday:  confirmed pt picked it up    2-tobacco use/vaping/hookah  Counselled for cessation    3-biplar/anx/depression:  -on gabapentin 300mg qam  -trazodone 50mg qhs  Risperdone 2mg bid  -MSW and psychiatry referrals    [facility] worked met w pt as well and will assist with PCP, psychiatrist and obtaining medications, insurance, MSW

## 2025-01-21 NOTE — PROGRESS NOTES
Client requested a visit with this Community Health Resource Nurse (CHRN) during outreach hours at [facility] ([facility] Client shared she had visited the ED earlier today and yesterday. She reported she is from [place] and is presently staying with her mom on [address]. Client amenable to establish care with PCP for follow-up to ED visits.   This CHRN sent Teams Chat to [facility] requesting appointment without success.  This CHRN informed client that the medical van at [facility] will be available today, Tuesday at 4:30 PM to 6:30 PM. CHRN provided supportive listening.Client pleasant in this encounter and verbalized appreciation for assistance. This CHRN remains available for support as needed.

## 2025-01-22 ENCOUNTER — DOCUMENTATION (OUTPATIENT)
Dept: OTHER | Facility: HOSPITAL | Age: 55
End: 2025-01-22

## 2025-01-22 ENCOUNTER — PATIENT OUTREACH (OUTPATIENT)
Dept: OTHER | Facility: OTHER | Age: 55
End: 2025-01-22

## 2025-01-22 ENCOUNTER — OFFICE VISIT (OUTPATIENT)
Dept: FAMILY MEDICINE CLINIC | Facility: CLINIC | Age: 55
End: 2025-01-22

## 2025-01-22 ENCOUNTER — TELEPHONE (OUTPATIENT)
Age: 55
End: 2025-01-22

## 2025-01-22 VITALS
RESPIRATION RATE: 22 BRPM | OXYGEN SATURATION: 96 % | BODY MASS INDEX: 21.68 KG/M2 | HEIGHT: 62 IN | DIASTOLIC BLOOD PRESSURE: 58 MMHG | WEIGHT: 117.8 LBS | TEMPERATURE: 97.9 F | HEART RATE: 98 BPM | SYSTOLIC BLOOD PRESSURE: 100 MMHG

## 2025-01-22 DIAGNOSIS — Z59.41 FOOD INSECURITY: ICD-10-CM

## 2025-01-22 DIAGNOSIS — Z00.00 ANNUAL PHYSICAL EXAM: Primary | ICD-10-CM

## 2025-01-22 DIAGNOSIS — F12.90 CURRENT EVERY DAY CANNABIS VAPING: ICD-10-CM

## 2025-01-22 DIAGNOSIS — F41.1 GAD (GENERALIZED ANXIETY DISORDER): ICD-10-CM

## 2025-01-22 DIAGNOSIS — F31.9 BIPOLAR 1 DISORDER (HCC): ICD-10-CM

## 2025-01-22 DIAGNOSIS — Z13.220 SCREENING, LIPID: ICD-10-CM

## 2025-01-22 DIAGNOSIS — G89.29 CHRONIC BILATERAL LOW BACK PAIN WITHOUT SCIATICA: ICD-10-CM

## 2025-01-22 DIAGNOSIS — Z12.11 SCREENING FOR COLORECTAL CANCER: ICD-10-CM

## 2025-01-22 DIAGNOSIS — Z59.819 HOUSING INSTABILITY: ICD-10-CM

## 2025-01-22 DIAGNOSIS — J06.9 UPPER RESPIRATORY TRACT INFECTION, UNSPECIFIED TYPE: ICD-10-CM

## 2025-01-22 DIAGNOSIS — J45.20 MILD INTERMITTENT ASTHMA, UNSPECIFIED WHETHER COMPLICATED: ICD-10-CM

## 2025-01-22 DIAGNOSIS — Z59.9 FINANCIAL DIFFICULTIES: ICD-10-CM

## 2025-01-22 DIAGNOSIS — Z13.1 SCREENING FOR DIABETES MELLITUS (DM): ICD-10-CM

## 2025-01-22 DIAGNOSIS — J40 BRONCHITIS: ICD-10-CM

## 2025-01-22 DIAGNOSIS — Z59.41 FOOD INSECURITY: Primary | ICD-10-CM

## 2025-01-22 DIAGNOSIS — Z12.4 CERVICAL CANCER SCREENING: ICD-10-CM

## 2025-01-22 DIAGNOSIS — Z12.12 SCREENING FOR COLORECTAL CANCER: ICD-10-CM

## 2025-01-22 DIAGNOSIS — Z11.3 SCREENING EXAMINATION FOR STI: ICD-10-CM

## 2025-01-22 DIAGNOSIS — J45.20 MILD INTERMITTENT ASTHMA, UNSPECIFIED WHETHER COMPLICATED: Primary | ICD-10-CM

## 2025-01-22 DIAGNOSIS — Z12.31 ENCOUNTER FOR SCREENING MAMMOGRAM FOR BREAST CANCER: ICD-10-CM

## 2025-01-22 DIAGNOSIS — M54.50 CHRONIC BILATERAL LOW BACK PAIN WITHOUT SCIATICA: ICD-10-CM

## 2025-01-22 DIAGNOSIS — Z59.82 INABILITY TO ACQUIRE TRANSPORTATION: ICD-10-CM

## 2025-01-22 PROBLEM — M54.9 BACK PAIN: Status: ACTIVE | Noted: 2021-12-16

## 2025-01-22 PROBLEM — R29.898 LOWER EXTREMITY WEAKNESS: Status: ACTIVE | Noted: 2019-01-23

## 2025-01-22 PROBLEM — F41.9 ANXIETY: Status: ACTIVE | Noted: 2019-01-23

## 2025-01-22 PROBLEM — Z59.01 SHELTERED HOMELESSNESS: Status: ACTIVE | Noted: 2025-01-18

## 2025-01-22 PROBLEM — J45.909 ASTHMA: Status: ACTIVE | Noted: 2019-01-23

## 2025-01-22 PROBLEM — F41.9 ANXIETY: Status: RESOLVED | Noted: 2019-01-23 | Resolved: 2025-01-22

## 2025-01-22 PROCEDURE — 99386 PREV VISIT NEW AGE 40-64: CPT

## 2025-01-22 PROCEDURE — 99204 OFFICE O/P NEW MOD 45 MIN: CPT

## 2025-01-22 RX ORDER — ACETAMINOPHEN 325 MG/1
650 TABLET ORAL EVERY 6 HOURS PRN
Qty: 30 TABLET | Refills: 0 | Status: SHIPPED | OUTPATIENT
Start: 2025-01-22

## 2025-01-22 RX ORDER — TRAZODONE HYDROCHLORIDE 50 MG/1
50 TABLET, FILM COATED ORAL
COMMUNITY

## 2025-01-22 RX ORDER — AZITHROMYCIN 250 MG/1
TABLET, FILM COATED ORAL
Qty: 6 TABLET | Refills: 0 | Status: SHIPPED | OUTPATIENT
Start: 2025-01-22 | End: 2025-01-26

## 2025-01-22 RX ORDER — IBUPROFEN 600 MG/1
600 TABLET, FILM COATED ORAL EVERY 6 HOURS PRN
Qty: 30 TABLET | Refills: 0 | Status: SHIPPED | OUTPATIENT
Start: 2025-01-22

## 2025-01-22 RX ORDER — GABAPENTIN 300 MG/1
300 CAPSULE ORAL
COMMUNITY
Start: 2025-01-17 | End: 2025-01-24

## 2025-01-22 RX ORDER — ALBUTEROL SULFATE 90 UG/1
2 INHALANT RESPIRATORY (INHALATION) EVERY 4 HOURS PRN
Qty: 8 G | Refills: 0 | Status: SHIPPED | OUTPATIENT
Start: 2025-01-22

## 2025-01-22 RX ORDER — ALBUTEROL SULFATE 0.83 MG/ML
2.5 SOLUTION RESPIRATORY (INHALATION) EVERY 6 HOURS PRN
Qty: 60 ML | Refills: 0 | Status: SHIPPED | OUTPATIENT
Start: 2025-01-22

## 2025-01-22 RX ORDER — RISPERIDONE 2 MG/1
2 TABLET ORAL 2 TIMES DAILY
COMMUNITY

## 2025-01-22 RX ORDER — LIDOCAINE 50 MG/G
1 PATCH TOPICAL DAILY
Qty: 30 PATCH | Refills: 1 | Status: SHIPPED | OUTPATIENT
Start: 2025-01-22

## 2025-01-22 RX ORDER — GUAIFENESIN 600 MG/1
600 TABLET, EXTENDED RELEASE ORAL EVERY 12 HOURS SCHEDULED
Qty: 6 TABLET | Refills: 0 | Status: SHIPPED | OUTPATIENT
Start: 2025-01-22 | End: 2025-01-25

## 2025-01-22 RX ORDER — PROPRANOLOL HYDROCHLORIDE 10 MG/1
10 TABLET ORAL 2 TIMES DAILY
COMMUNITY

## 2025-01-22 SDOH — ECONOMIC STABILITY - FOOD INSECURITY: FOOD INSECURITY: Z59.41

## 2025-01-22 SDOH — ECONOMIC STABILITY - TRANSPORTATION SECURITY: TRANSPORTATION INSECURITY: Z59.82

## 2025-01-22 SDOH — ECONOMIC STABILITY - INCOME SECURITY: PROBLEM RELATED TO HOUSING AND ECONOMIC CIRCUMSTANCES, UNSPECIFIED: Z59.9

## 2025-01-22 SDOH — ECONOMIC STABILITY - HOUSING INSECURITY: HOUSING INSTABILITY UNSPECIFIED: Z59.819

## 2025-01-22 NOTE — PATIENT INSTRUCTIONS
Outpatient Mental Health Resources    If you would like to be placed on the wait list for services with [facility] you MUST contact intake at [facility] - [phone]    Emergency & Crisis Support    Suicide and Crisis Lifeline: Call or text 988 (Available 24 hours)  Crisis Text Line: Text HOME to 612949 (Available 24/7)  Warm Line: Call [phone] (Confidential mental health support, available Monday-Sunday: 6 AM-10 AM & 4 PM-12 AM)  [facility]: Call [phone] (For mental health emergencies, or visit your local Emergency Department)  [facility] [place]: Call [phone] (24/7 [facility]ocate Hotline, counseling, court & hospital accompaniment, free services)    [facility]    [facility]  Call [phone]  Website: [email or web address]  Support for mental health conditions. Free services for all    [facility]  [address], [place] [address] [phone]  Services for all ages; Bilingual (English/Togolese); Accepts Medical Assistance, Medicare and commercial insurance    [facility]  [address], [place] [address] [phone]  Services for all ages; Bilingual (English/Togolese); Accepts Highmark Blue Cross Blue Shield, Magellan, Aetna, Optum and Cigna    [facility]  [address], [place] [address] [phone]  Services for ages 4+. English only; Does NOT accept Medical Assistance (only Commercial Insurance)    [facility]   [address], [place], [place]   [phone]  Services for all ages; English only; Accepts Capital Blue Cross Blue Shield, Highmark Blue Cross Blue Sheild and Medicare    [facility]  [address] [place] [address] [phone]  Services for ages 6+. Bilingual (English/Togolese); Accepts Medical Assistance only    [facility]  [address] [place] [address]  [phone]  Services for ages 5+.  Bilingual (English/Ghanaian); Accepts Medical Assistance    [facility]  [address], [place] [address]  [phone]  Services for ages 14+. Bilingual (English/Ghanaian); Accepts Medical Assistance, Medicare, and Commercial Insurance    [facility]  [address] [place] [address]  [phone]  Services for ages 5+. Bilingual (English/Ghanaian); Accepts Medical Assistance    [facility]  [address], [place] [address]  [phone]  Services for ages 3+. Bilingual (English/Ghanaian); Accepts Medical Assistance and Some Commercial Insurance    [facility]  [address], [address], [place] [address]  [phone]  Services for ages 5+. Bilingual (English/Ghanaian); Accepts Medical Assistance    [facility]  [address], [address], [place] [address]  [phone]  Services for ages 6+. Bilingual (English/Ghanaian); Accepts Medical Assistance and Commercial Insurance    [facility]   [address] [place] [address]  [phone]  Services for ages 5+. Bilingual (English/Ghanaian); Accepts Medical Assistance, Medicare and Commercial Insurance     [facility]  [address], [place] [address]  [phone]  Services for all ages (Therapy); 18+ (Psychiatry); English only; Accepts Capital Blue Cross, Aetna, Highmark, Magellan, [facility] (CHIP & commercial insurance)      [email or web address] is a resource to find psychotherapy providers, patients can filter therapist search list based on several criteria including language, specialty, gender, insurance, etc. Individuals seeking will need to reach out to perspective providers through information in the directory. You are encouraged to contact multiple providers to given that many providers have a significant wait list for services as well as to find a provider is a good fit for you!     Please contact your insurance provider for additional information.    Patient  "Education     Routine physical for adults   The Basics   Written by the doctors and editors at [facility]   What is a physical? -- A physical is a routine visit, or \"check-up,\" with your doctor. You might also hear it called a \"wellness visit\" or \"preventive visit.\"  During each visit, the doctor will:   Ask about your physical and mental health   Ask about your habits, behaviors, and lifestyle   Do an exam   Give you vaccines if needed   Talk to you about any medicines you take   Give advice about your health   Answer your questions  Getting regular check-ups is an important part of taking care of your health. It can help your doctor find and treat any problems you have. But it's also important for preventing health problems.  A routine physical is different from a \"sick visit.\" A sick visit is when you see a doctor because of a health concern or problem. Since physicals are scheduled ahead of time, you can think about what you want to ask the doctor.  How often should I get a physical? -- It depends on your age and health. In general, for people age 21 years and older:   If you are younger than 50 years, you might be able to get a physical every 3 years.   If you are 50 years or older, your doctor might recommend a physical every year.  If you have an ongoing health condition, like diabetes or high blood pressure, your doctor will probably want to see you more often.  What happens during a physical? -- In general, each visit will include:   Physical exam - The doctor or nurse will check your height, weight, heart rate, and blood pressure. They will also look at your eyes and ears. They will ask about how you are feeling and whether you have any symptoms that bother you.   Medicines - It's a good idea to bring a list of all the medicines you take to each doctor visit. Your doctor will talk to you about your medicines and answer any questions. Tell them if you are having any side effects that bother you. You should " "also tell them if you are having trouble paying for any of your medicines.   Habits and behaviors - This includes:   Your diet   Your exercise habits   Whether you smoke, drink alcohol, or use drugs   Whether you are sexually active   Whether you feel safe at home  Your doctor will talk to you about things you can do to improve your health and lower your risk of health problems. They will also offer help and support. For example, if you want to quit smoking, they can give you advice and might prescribe medicines. If you want to improve your diet or get more physical activity, they can help you with this, too.   Lab tests, if needed - The tests you get will depend on your age and situation. For example, your doctor might want to check your:   Cholesterol   Blood sugar   Iron level   Vaccines - The recommended vaccines will depend on your age, health, and what vaccines you already had. Vaccines are very important because they can prevent certain serious or deadly infections.   Discussion of screening - \"Screening\" means checking for diseases or other health problems before they cause symptoms. Your doctor can recommend screening based on your age, risk, and preferences. This might include tests to check for:   Cancer, such as breast, prostate, cervical, ovarian, colorectal, prostate, lung, or skin cancer   Sexually transmitted infections, such as chlamydia and gonorrhea   Mental health conditions like depression and anxiety  Your doctor will talk to you about the different types of screening tests. They can help you decide which screenings to have. They can also explain what the results might mean.   Answering questions - The physical is a good time to ask the doctor or nurse questions about your health. If needed, they can refer you to other doctors or specialists, too.  Adults older than 65 years often need other care, too. As you get older, your doctor will talk to you about:   How to prevent falling at " home   Hearing or vision tests   Memory testing   How to take your medicines safely   Making sure that you have the help and support you need at home  All topics are updated as new evidence becomes available and our peer review process is complete.  This topic retrieved from [employer] on: May 02, 2024.  Topic 065966 Version 1.0  Release: 32.4.3 - C32.122  © 2024 UpToDate, Inc. and/or its affiliates. All rights reserved.  Consumer Information Use and Disclaimer   Disclaimer: This generalized information is a limited summary of diagnosis, treatment, and/or medication information. It is not meant to be comprehensive and should be used as a tool to help the user understand and/or assess potential diagnostic and treatment options. It does NOT include all information about conditions, treatments, medications, side effects, or risks that may apply to a specific patient. It is not intended to be medical advice or a substitute for the medical advice, diagnosis, or treatment of a health care provider based on the health care provider's examination and assessment of a patient's specific and unique circumstances. Patients must speak with a health care provider for complete information about their health, medical questions, and treatment options, including any risks or benefits regarding use of medications. This information does not endorse any treatments or medications as safe, effective, or approved for treating a specific patient. UpToDate, Inc. and its affiliates disclaim any warranty or liability relating to this information or the use thereof.The use of this information is governed by the Terms of Use, available at https://www.woltersPlaydate Appuwer.com/en/know/clinical-effectiveness-terms. 2024© UpToDate, Inc. and its affiliates and/or licensors. All rights reserved.  Copyright   © 2024 UpToDate, Inc. and/or its affiliates. All rights reserved.

## 2025-01-22 NOTE — TELEPHONE ENCOUNTER
Patients GI provider:  Sharla    Number to return call: [phone]    Reason for call: Patient and [facility] called in to set up appt for patient. Patient has cancelled [place] health insurance and is waiting to be approved for [facility] insurance. Patient will be returning our call to update insurance prior to appt. Thank you.    Scheduled procedure/appointment date if applicable: Apt/procedure 2/21/2025

## 2025-01-22 NOTE — PROGRESS NOTES
Met with client on the medical van at [facility]. Client needs assistance with medication coverage and a PCP appointment. This writer will make an appointment for client during business hours on 1/22 and will send an email to get medications approved.

## 2025-01-22 NOTE — PROGRESS NOTES
This writer made an appointment for client through Jennifer via TEAMS for 1/22 @ 2 pm. Client needs assistance getting ID, birth certificate, and other assistance. This writer put in a referral to social work.

## 2025-01-22 NOTE — PROGRESS NOTES
Adult Annual Physical  Name: Jackie Rose      : 1970      MRN: [id number]  Encounter Provider: TERA Miranda  Encounter Date: 2025   Encounter department: [facility]    Assessment & Plan  Annual physical exam  Immunizations and preventive care screenings were discussed with patient today. Appropriate education was printed on patient's after visit summary.  Patient declined routine vaccination including flu and pneumonia.  Will address at follow-up visit.  Other care gaps addressed and ordered as appropriate.    Orders:    TSH, 3rd generation with Free T4 reflex; Future    CBC and differential; Future    Comprehensive metabolic panel; Future    Chronic bilateral low back pain without sciatica  No red flags on exam.  Patient appears at baseline function.  Will provide refills of acetaminophen and ibuprofen.  Advised patient that we do not do opioid medications for chronic back pain.  Will trial Lidoderm.  Encouraged patient to stay active and to notify the office when she obtains insurance for PT referral.    Orders:    ibuprofen (MOTRIN) 600 mg tablet; Take 1 tablet (600 mg total) by mouth every 6 (six) hours as needed for moderate pain    acetaminophen (TYLENOL) 325 mg tablet; Take 2 tablets (650 mg total) by mouth every 6 (six) hours as needed for mild pain    lidocaine (Lidoderm) 5 %; Apply 1 patch topically over 12 hours daily Remove & Discard patch within 12 hours or as directed by MD    Mild intermittent asthma, unspecified whether complicated  Breathing stable.  Mild wheezing noted on exam.  Advised to continue albuterol as needed, prednisone and azithromycin.  Return precautions given.         Bipolar 1 disorder (HCC)    Patient appears hypomanic on exam without psychotic features.  She is pacing in the room and restless. She denies any hallucinations, suicidal ideation.     She had a recent admission to inpatient behavioral health on  12/27/2024-1/8/2025.  After being discharged from inpatient , she moved in with her mother.  She is a poor historian when it comes to her medications.  Patient was instructed to discontinue lithium upon discharge from the hospital.  Patient reports she is no longer taking it.  She does say that she is taking trazodone, gabapentin, risperidone, and propranolol.  However, she is not consistent and states she has been cutting her pills in half.  She was encouraged to take the medications as prescribed in order to prevent rehospitalization.  I suspect her hypomania is related to inadequate medication compliance.  She would likely be a good candidate for long-acting injectable in the future to assist with mental health stabilization and compliance.    [facility] reviewed and they report they will be connecting the patient with psychiatric care.  Strongly encouraged patient to comply with medication regimen as well as establishing with psychiatry and continuing follow-up.  Patient given a list of outpatient mental health resources.  She has a follow-up with [facility] on 1/24/2025.   Strict ED precautions given.       SAIMA (generalized anxiety disorder)  See plan for bipolar 1 disorder.       Current every day cannabis vaping  Patient not ready to quit.  Patient reports the cannabis assists her with her anxiety.  We did discuss the possibility that it could have the opposite effect and worsen her mental health.  Advised her that if she wishes to continue cannabis to establish with a medical marijuana provider to ensure safety.  Advised to avoid vaping for overall lung health.  Patient not ready to quit.       Screening, lipid    Orders:    Lipid panel; Future    Screening for diabetes mellitus (DM)    Orders:    Hemoglobin A1C; Future    Financial difficulties         Food insecurity         Housing instability         Inability to acquire transportation         Screening for colorectal cancer  No changes  to bowel regimen.  Denies change in caliber of stool, bloody or black tarry stool.  No recent unexplained weight loss.  Will refer to GI for routine colorectal cancer screening.  Orders:    Ambulatory Referral to Gastroenterology; Future    Encounter for screening mammogram for breast cancer  Patient denies any breast changes.  Orders:    Mammo screening bilateral w 3d and cad; Future    Cervical cancer screening  Patient denies any menstrual abnormalities, vaginal discharge, or genitourinary lesions.  Advised to establish with OB/GYN for routine well woman care as well as Pap smear that is overdue.  Orders:    Ambulatory Referral to Obstetrics / Gynecology; Future    Screening examination for STI  Patient denies symptoms.  Requests routine screening.  Orders:    RPR-Syphilis Screening (Total Syphilis IGG/IGM); Future    Chlamydia/GC amplified DNA by PCR; Future    Trichomonas Vaginalis, FAITH; Future    HIV 1/2 AG/AB w Reflex [facility] for 2 yr old and above; Future    Hepatitis C antibody; Future        Counseling:  Alcohol/drug use: discussed moderation in alcohol intake, the recommendations for healthy alcohol use, and avoidance of illicit drug use.  Dental Health: discussed importance of regular tooth brushing, flossing, and dental visits.  Injury prevention: discussed safety/seat belts, safety helmets, smoke detectors, carbon monoxide detectors, and smoking near bedding or upholstery.  Sexual health: discussed sexually transmitted diseases, partner selection, use of condoms, avoidance of unintended pregnancy, and contraceptive alternatives.  Exercise: the importance of regular exercise/physical activity was discussed. Recommend exercise 3-5 times per week for at least 30 minutes.       Depression Screening and Follow-up Plan: Patient was screened for depression during today's encounter. They screened negative with a PHQ-2 score of 0.    Tobacco Cessation Counseling: Tobacco cessation counseling was provided. The  "patient is sincerely urged to quit consumption of tobacco. She is not ready to quit tobacco.         History of Present Illness     Patient Active Problem List   Diagnosis    Mild intermittent asthma, unspecified whether complicated    Bipolar 1 disorder (HCC)    Chronic low back pain    SAIMA (generalized anxiety disorder)    Lower extremity weakness    Sheltered homelessness         Adult Annual Physical:  Patient presents for annual physical. Jackie Rose is a 54-year-old female with a history of mild intermittent asthma, bipolar 1 disorder, chronic low back pain, generalized anxiety disorder, lower extremity weakness, and sheltered homelessness.    She presents to the office today to establish care and for routine physical exam.  She reports she recently moved from [place] and is now staying with her mother.  She is not sure how much longer she will be able to stay with her mother.  She has applied for [place] medical assistance as well as welfare benefits.  She is requesting a social work referral.  She is not compliant with her mental health medications.  She reports they \"make me feel like I am on cocaine\".  If she does take them, she will split them in half.  She denies use of illicit substances as well as alcohol.  She does state that she vapes THC.  She was recently evaluated in the ED for an asthma exacerbation.  She was provided with an albuterol inhaler, azithromycin, and prednisone.  She states she picked these up from the pharmacy and is currently taking them.     She has chronic back pain, but no acute change in the pain.  She denies numbness, tingling, and no increase in weakness above baseline.  She denies saddle anesthesia.  She is requesting pain medication for her low back pain and wants \"something stronger than Tylenol and Motrin\".     Diet and Physical Activity:  - Diet/Nutrition: well balanced diet.  - Exercise: walking and moderate cardiovascular exercise.    Depression Screening:  - " PHQ-2 Score: 0    General Health:  - Sleep: sleeps poorly and 1-3 hours of sleep on average.  - Hearing: normal hearing bilateral ears.  - Vision: vision problems. can't see close or far, needs referral  - Dental: no dental visits for > 1 year. needs referral    /GYN Health:  - Follows with GYN: no.   - History of STDs: no    Review of Systems   Constitutional:  Negative for chills and fever.   HENT:  Negative for ear pain and sore throat.    Eyes:  Negative for pain and visual disturbance.   Respiratory:  Negative for cough and shortness of breath.    Cardiovascular:  Negative for chest pain and palpitations.   Gastrointestinal:  Negative for abdominal pain and vomiting.   Genitourinary:  Negative for dysuria and hematuria.   Musculoskeletal:  Positive for back pain. Negative for arthralgias.   Skin:  Negative for color change and rash.   Neurological:  Negative for seizures and syncope.   Psychiatric/Behavioral:  Positive for decreased concentration and sleep disturbance. Negative for agitation, confusion, dysphoric mood, hallucinations, self-injury and suicidal ideas. The patient is nervous/anxious and is hyperactive.    All other systems reviewed and are negative.    Pertinent Medical History   Patient Active Problem List   Diagnosis    Mild intermittent asthma, unspecified whether complicated    Bipolar 1 disorder (HCC)    Chronic low back pain    SAIMA (generalized anxiety disorder)    Lower extremity weakness    Sheltered homelessness         Medical History Reviewed by provider this encounter:  Tobacco  Allergies  Meds  Problems  Med Hx  Surg Hx  Fam Hx     .  Current Outpatient Medications on File Prior to Visit   Medication Sig Dispense Refill    albuterol (Ventolin HFA) 90 mcg/act inhaler Inhale 2 puffs every 6 (six) hours as needed for wheezing 18 g 0    benzonatate (TESSALON PERLES) 100 mg capsule Take 1 capsule (100 mg total) by mouth 3 (three) times a day as needed for cough 10 capsule 0     Camphor-Eucalyptus-Menthol (Vicks VapoRub) 4.7-1.2-2.6 % OINT Apply topically daily at bedtime as needed (congestion) for up to 1 dose 50 g 0    fluticasone (FLONASE) 50 mcg/act nasal spray 1 spray into each nostril daily 16 g 0    gabapentin (NEURONTIN) 300 mg capsule Take 300 mg by mouth      predniSONE 10 mg tablet Take 5 tablets (50 mg total) by mouth daily for 5 days 25 tablet 0    propranolol (INDERAL) 10 mg tablet Take 10 mg by mouth 2 (two) times a day      risperiDONE (RisperDAL) 2 mg tablet Take 2 mg by mouth 2 (two) times a day      traZODone (DESYREL) 50 mg tablet Take 50 mg by mouth      [DISCONTINUED] acetaminophen (TYLENOL) 325 mg tablet Take 2 tablets (650 mg total) by mouth every 6 (six) hours as needed for mild pain 30 tablet 0    [DISCONTINUED] ibuprofen (MOTRIN) 600 mg tablet Take 1 tablet (600 mg total) by mouth every 6 (six) hours as needed for moderate pain 30 tablet 0    [DISCONTINUED] albuterol (2.5 mg/3 mL) 0.083 % nebulizer solution Take 3 mL (2.5 mg total) by nebulization every 6 (six) hours as needed for wheezing or shortness of breath 60 mL 0    [DISCONTINUED] albuterol (Proventil HFA) 90 mcg/act inhaler Inhale 2 puffs every 4 (four) hours as needed for wheezing 8 g 0    [DISCONTINUED] azithromycin (ZITHROMAX) 250 mg tablet Take 2 tablets today then 1 tablet daily x 4 days 6 tablet 0    [DISCONTINUED] guaiFENesin (MUCINEX) 600 mg 12 hr tablet Take 1 tablet (600 mg total) by mouth every 12 (twelve) hours for 3 days 6 tablet 0     No current facility-administered medications on file prior to visit.      Social History     Tobacco Use    Smoking status: Some Days     Current packs/day: 0.50     Types: Cigarettes    Smokeless tobacco: Never   Vaping Use    Vaping status: Every Day    Substances: Nicotine   Substance and Sexual Activity    Alcohol use: Not Currently    Drug use: Yes     Types: Marijuana    Sexual activity: Not on file       Objective   /58 (BP Location: Left arm,  "Patient Position: Standing, Cuff Size: Standard)   Pulse 98   Temp 97.9 °F (36.6 °C) (Temporal)   Resp 22   Ht 5' 2\" (1.575 m)   Wt 53.4 kg (117 lb 12.8 oz)   LMP  (LMP Unknown)   SpO2 96%   BMI 21.55 kg/m²     Physical Exam  Vitals and nursing note reviewed.   Constitutional:       General: She is not in acute distress.     Appearance: She is well-developed.   HENT:      Head: Normocephalic and atraumatic.      Right Ear: Tympanic membrane, ear canal and external ear normal.      Left Ear: Tympanic membrane, ear canal and external ear normal.      Nose: Nose normal. No congestion or rhinorrhea.      Mouth/Throat:      Mouth: Mucous membranes are moist.      Pharynx: Oropharynx is clear. No oropharyngeal exudate or posterior oropharyngeal erythema.   Eyes:      Extraocular Movements: Extraocular movements intact.      Conjunctiva/sclera: Conjunctivae normal.      Pupils: Pupils are equal, round, and reactive to light.   Cardiovascular:      Rate and Rhythm: Normal rate and regular rhythm.      Pulses: Normal pulses.      Heart sounds: Normal heart sounds. No murmur heard.  Pulmonary:      Effort: Pulmonary effort is normal. No respiratory distress.      Breath sounds: Normal breath sounds. No wheezing.   Abdominal:      General: Bowel sounds are normal.      Palpations: Abdomen is soft.      Tenderness: There is no abdominal tenderness.   Musculoskeletal:         General: No swelling.      Cervical back: Neck supple.   Lymphadenopathy:      Cervical: No cervical adenopathy.   Skin:     General: Skin is warm and dry.      Capillary Refill: Capillary refill takes less than 2 seconds.      Findings: No lesion or rash.   Neurological:      General: No focal deficit present.      Mental Status: She is alert.      Motor: No weakness.   Psychiatric:         Attention and Perception: She is inattentive.         Mood and Affect: Mood is anxious.         Speech: Speech is tangential.         Behavior: Behavior is " hyperactive.         Thought Content: Thought content is not delusional. Thought content does not include homicidal or suicidal ideation. Thought content does not include homicidal or suicidal plan.         Judgment: Judgment is impulsive.

## 2025-01-22 NOTE — ASSESSMENT & PLAN NOTE
Patient appears hypomanic on exam without psychotic features.  She is pacing in the room and restless. She denies any hallucinations, suicidal ideation.     She had a recent admission to inpatient behavioral health on 12/27/2024-1/8/2025.  After being discharged from inpatient , she moved in with her mother.  She is a poor historian when it comes to her medications.  Patient was instructed to discontinue lithium upon discharge from the hospital.  Patient reports she is no longer taking it.  She does say that she is taking trazodone, gabapentin, risperidone, and propranolol.  However, she is not consistent and states she has been cutting her pills in half.  She was encouraged to take the medications as prescribed in order to prevent rehospitalization.  I suspect her hypomania is related to inadequate medication compliance.  She would likely be a good candidate for long-acting injectable in the future to assist with mental health stabilization and compliance.    [facility] notes reviewed and they report they will be connecting the patient with psychiatric care.  Strongly encouraged patient to comply with medication regimen as well as establishing with psychiatry and continuing follow-up.  Patient given a list of outpatient mental health resources.  She has a follow-up with [facility] on 1/24/2025.   Strict ED precautions given.

## 2025-01-22 NOTE — ASSESSMENT & PLAN NOTE
Breathing stable.  Mild wheezing noted on exam.  Advised to continue albuterol as needed, prednisone and azithromycin.  Return precautions given.

## 2025-01-22 NOTE — ASSESSMENT & PLAN NOTE
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  Back to OR tomorrow for washout and hopefully closure of the stump, tentatively around noon  Continue empiric antibiotics. FU cultures.  Appreciate hospitalist service's support managing Mr. Bains    Usman Fisher MD  [facility] (General and Vascular Surgery)  Cell: [phone] (text or call is fine, if you don't reach me please try my office)  Office: [phone]  __________________________________________________________________  Patient:Jimenez Bains   MRN:[id number]   CSN:[id number]    7/28/2020    9:32 AM    " No red flags on exam.  Patient appears at baseline function.  Will provide refills of acetaminophen and ibuprofen.  Advised patient that we do not do opioid medications for chronic back pain.  Will trial Lidoderm.  Encouraged patient to stay active and to notify the office when she obtains insurance for PT referral.    Orders:    ibuprofen (MOTRIN) 600 mg tablet; Take 1 tablet (600 mg total) by mouth every 6 (six) hours as needed for moderate pain    acetaminophen (TYLENOL) 325 mg tablet; Take 2 tablets (650 mg total) by mouth every 6 (six) hours as needed for mild pain    lidocaine (Lidoderm) 5 %; Apply 1 patch topically over 12 hours daily Remove & Discard patch within 12 hours or as directed by MD

## 2025-01-23 ENCOUNTER — TELEPHONE (OUTPATIENT)
Dept: FAMILY MEDICINE CLINIC | Facility: CLINIC | Age: 55
End: 2025-01-23

## 2025-01-23 ENCOUNTER — PATIENT OUTREACH (OUTPATIENT)
Facility: HOSPITAL | Age: 55
End: 2025-01-23

## 2025-01-23 ENCOUNTER — PATIENT OUTREACH (OUTPATIENT)
Dept: OTHER | Facility: OTHER | Age: 55
End: 2025-01-23

## 2025-01-23 ENCOUNTER — PATIENT OUTREACH (OUTPATIENT)
Dept: FAMILY MEDICINE CLINIC | Facility: CLINIC | Age: 55
End: 2025-01-23

## 2025-01-23 VITALS
OXYGEN SATURATION: 96 % | SYSTOLIC BLOOD PRESSURE: 118 MMHG | RESPIRATION RATE: 18 BRPM | DIASTOLIC BLOOD PRESSURE: 76 MMHG | HEART RATE: 83 BPM

## 2025-01-23 DIAGNOSIS — F41.1 GAD (GENERALIZED ANXIETY DISORDER): ICD-10-CM

## 2025-01-23 DIAGNOSIS — F31.9 BIPOLAR 1 DISORDER (HCC): ICD-10-CM

## 2025-01-23 DIAGNOSIS — Z13.9 SCREENING DUE: ICD-10-CM

## 2025-01-23 DIAGNOSIS — J44.9 CHRONIC OBSTRUCTIVE PULMONARY DISEASE, UNSPECIFIED COPD TYPE (HCC): Primary | ICD-10-CM

## 2025-01-23 DIAGNOSIS — M54.30 SCIATICA, UNSPECIFIED LATERALITY: ICD-10-CM

## 2025-01-23 DIAGNOSIS — Z78.9 NEEDS ASSISTANCE WITH COMMUNITY RESOURCES: Primary | ICD-10-CM

## 2025-01-23 DIAGNOSIS — F31.12 BIPOLAR 1 DISORDER WITH MODERATE MANIA (HCC): ICD-10-CM

## 2025-01-23 DIAGNOSIS — F32.A DEPRESSION, UNSPECIFIED DEPRESSION TYPE: ICD-10-CM

## 2025-01-23 DIAGNOSIS — Z76.89 POOR DENTITION REQUIRING REFERRAL TO DENTISTRY: ICD-10-CM

## 2025-01-23 DIAGNOSIS — R07.1 CHEST PAIN ON BREATHING: ICD-10-CM
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
## 2025-01-23 NOTE — TELEPHONE ENCOUNTER
Patient does not need a referral for those. She just goes to the dentist (she can go upstairs) or any optometrist her insurance will cover.

## 2025-01-23 NOTE — PROGRESS NOTES
Name: Jackie Rose      : 1970      MRN: [id number]  Encounter Provider: Bello Haas  Encounter Date: 2025   Encounter [facility]: [facility]  Assessment & Plan  Chest pain on breathing  Differential diagnosis includes referred pain from recent bronchitis. CXR not showing any consolidation; however, PNA also possibility causing referred pain. Costochondritis also possible. Given that pain increases when breathing deeply, this is likely pleuritic chest pain. Less likely ACS or PE, given that this pain has been present for several days. Patient previously had pain with swallowing, differential can also include GERD, infectious esophageal lesion, such as HIV, HSV, CMV, or esophageal spasm. Prior lipid panel from  showing slightly high LDL. No EKG on file     Plan:  Pain relief with OTC ibuprofen  Patient counseled on importance of seeking additional care if pain acutely worsens   Recommend EKG, lipid panel at next office visit to obtain better history of cardiac health    Bipolar 1 disorder with moderate jonas (HCC)  Many records from patient's past not readily available; however, per chart review, appears patient was hospitalized several times for psychiatric needs     Plan:  Patient counseled by psychiatry residents to increase gabapentin dosage from 100 mg daily to 100 mg TID  Referral placed with psychiatry for med management    Sciatica, unspecified laterality  Patient mentioned hx of sciatica, still bothersome     Plan:  Increasing gabapentin per psych recommendations should improve this pain. Continue to monitor and consider increasing dose at next outpt visit, as there is plenty of room to increase dosage if need be    Chronic obstructive pulmonary disease, unspecified COPD type (HCC)  Diagnosed based off significant smoking history by other providers. No PFTs ever performed.     Plan:  Continue albuterol PRN for sx relief  Counseled on importance of avoiding  "smoking/vaping  Based on smoking history, patient should have yearly low dose CT scan for lung cancer screening in addition to PFTs to make COPD diagnosis     Screening due  Several screenings outdating, including lung cancer, colon cancer, HIV, hepatitis, mammogram, A1c     Plan:  At next outpatient visit, recommend ordering each of these      History of Present Illness   Jackie Rose is a 55 y/o F who presents to [facility] with complaint of L-sided chest pain for the last 4 days. She says that it feels someone is scratching her. She rates the pain on a scale of 7. Pain increases when she breathes in or takes deep breath. Pain increases when she is sitting down for a long time. Has not determined any aggravating/alleviating factors aside from ibuprofen yesterday which helped somewhat. When asked about any trauma to the chest wall or any surgeries, she says that she had breast implants many years ago.    Of note, she says she was seen by a provider for bronchitis recently, for which she received Abx; however, she has not been taking her Abx regularly.    She also expressed interest in speaking to a provider about her bipolar disorder. Denies substance use. Endorses cannabis vape use. States she was recently admitted to [facility] in [place] recently for 2 weeks for her \"depression and anxiety.\"      History obtained from: patient    Review of Systems   Constitutional:  Negative for activity change, appetite change, chills, fatigue, fever and unexpected weight change.   HENT:  Positive for congestion, sore throat and trouble swallowing. Negative for voice change.    Eyes:  Negative for visual disturbance.   Respiratory:  Positive for cough, chest tightness and shortness of breath. Negative for choking.    Cardiovascular:  Positive for chest pain. Negative for palpitations.   Gastrointestinal:  Negative for abdominal distention, abdominal pain, diarrhea, nausea and vomiting.   Musculoskeletal:  Negative for back " pain.   Skin:  Negative for rash and wound.   Neurological:  Negative for dizziness, tremors, facial asymmetry, speech difficulty, weakness, light-headedness and headaches.   Psychiatric/Behavioral:  Positive for behavioral problems. Negative for hallucinations, self-injury and suicidal ideas. The patient is nervous/anxious and is hyperactive.        Current Outpatient Medications on File Prior to Visit   Medication Sig Dispense Refill    acetaminophen (TYLENOL) 325 mg tablet Take 2 tablets (650 mg total) by mouth every 6 (six) hours as needed for mild pain 30 tablet 0    albuterol (2.5 mg/3 mL) 0.083 % nebulizer solution Take 3 mL (2.5 mg total) by nebulization every 6 (six) hours as needed for wheezing or shortness of breath 60 mL 0    albuterol (Proventil HFA) 90 mcg/act inhaler Inhale 2 puffs every 4 (four) hours as needed for wheezing 8 g 0    albuterol (Ventolin HFA) 90 mcg/act inhaler Inhale 2 puffs every 6 (six) hours as needed for wheezing 18 g 0    azithromycin (ZITHROMAX) 250 mg tablet Take 2 tablets today then 1 tablet daily x 4 days (Patient not taking: Reported on 1/22/2025) 6 tablet 0    benzonatate (TESSALON PERLES) 100 mg capsule Take 1 capsule (100 mg total) by mouth 3 (three) times a day as needed for cough 10 capsule 0    Camphor-Eucalyptus-Menthol (Vicks VapoRub) 4.7-1.2-2.6 % OINT Apply topically daily at bedtime as needed (congestion) for up to 1 dose 50 g 0    fluticasone (FLONASE) 50 mcg/act nasal spray 1 spray into each nostril daily 16 g 0    gabapentin (NEURONTIN) 300 mg capsule Take 300 mg by mouth      guaiFENesin (MUCINEX) 600 mg 12 hr tablet Take 1 tablet (600 mg total) by mouth every 12 (twelve) hours for 3 days 6 tablet 0    ibuprofen (MOTRIN) 600 mg tablet Take 1 tablet (600 mg total) by mouth every 6 (six) hours as needed for moderate pain 30 tablet 0    lidocaine (Lidoderm) 5 % Apply 1 patch topically over 12 hours daily Remove & Discard patch within 12 hours or as directed by MD  30 patch 1    predniSONE 10 mg tablet Take 5 tablets (50 mg total) by mouth daily for 5 days 25 tablet 0    propranolol (INDERAL) 10 mg tablet Take 10 mg by mouth 2 (two) times a day      risperiDONE (RisperDAL) 2 mg tablet Take 2 mg by mouth 2 (two) times a day      traZODone (DESYREL) 50 mg tablet Take 50 mg by mouth       No current facility-administered medications on file prior to visit.      Social History     Tobacco Use    Smoking status: Some Days     Current packs/day: 0.50     Types: Cigarettes    Smokeless tobacco: Never   Vaping Use    Vaping status: Every Day    Substances: Nicotine   Substance and Sexual Activity    Alcohol use: Not Currently     Comment: H/o alcohol abuse    Drug use: Yes     Types: Marijuana, Cocaine, Heroin     Comment: H/o cocaine and heroin use in [id number]    Sexual activity: Not on file        Objective   /76   Pulse 83   Resp 18   LMP  (LMP Unknown)   SpO2 96%      Physical Exam  Constitutional:       General: She is not in acute distress.     Appearance: She is normal weight.   HENT:      Head: Normocephalic and atraumatic.      Right Ear: External ear normal.      Left Ear: External ear normal.      Nose: Nose normal.      Mouth/Throat:      Mouth: Mucous membranes are moist.   Eyes:      General: No scleral icterus.        Right eye: No discharge.         Left eye: No discharge.      Conjunctiva/sclera: Conjunctivae normal.   Cardiovascular:      Rate and Rhythm: Normal rate and regular rhythm.      Pulses: Normal pulses.      Heart sounds: Normal heart sounds.   Pulmonary:      Breath sounds: Wheezing and rhonchi present.      Comments: Rhonci heard in upper and middle lung fields b/l. Inspiratory wheeze in lower lung fields b/l.  Skin:     General: Skin is warm and dry.      Findings: No rash.   Neurological:      General: No focal deficit present.      Mental Status: She is alert.   Psychiatric:         Judgment: Judgment normal.      Comments: anxious during  encounter and hyperactive, tangential thought process       Bello Haas, MS4

## 2025-01-23 NOTE — PROGRESS NOTES
Client in need of medications to be covered by [employer]. This writer gained approval for Lidocaine patches, acetaminophen, ibuprofen, Mucus Relief, Azithromycin, and Albuterol HFA Inhaler which the total cost equals $147.17. Approval given by Margret RASHEED     Client also filled out a referral for [facility] for psychiatric care and therapy. This writer sent referral to Sara at [facility]. No further outreach scheduled.

## 2025-01-24 ENCOUNTER — OFFICE VISIT (OUTPATIENT)
Dept: FAMILY MEDICINE CLINIC | Facility: CLINIC | Age: 55
End: 2025-01-24

## 2025-01-24 VITALS
RESPIRATION RATE: 18 BRPM | OXYGEN SATURATION: 93 % | WEIGHT: 115 LBS | BODY MASS INDEX: 21.16 KG/M2 | SYSTOLIC BLOOD PRESSURE: 112 MMHG | HEIGHT: 62 IN | HEART RATE: 94 BPM | TEMPERATURE: 98.2 F | DIASTOLIC BLOOD PRESSURE: 70 MMHG

## 2025-01-24 DIAGNOSIS — F31.9 BIPOLAR 1 DISORDER (HCC): Primary | ICD-10-CM

## 2025-01-24 DIAGNOSIS — G89.29 CHRONIC LEFT-SIDED LOW BACK PAIN WITHOUT SCIATICA: ICD-10-CM

## 2025-01-24 DIAGNOSIS — M54.50 CHRONIC LEFT-SIDED LOW BACK PAIN WITHOUT SCIATICA: ICD-10-CM

## 2025-01-24 RX ORDER — GABAPENTIN 100 MG/1
100 CAPSULE ORAL 3 TIMES DAILY
Qty: 90 CAPSULE | Refills: 0 | Status: SHIPPED | OUTPATIENT
Start: 2025-01-24

## 2025-01-24 NOTE — PROGRESS NOTES
Name: Jackie Rose      : 1970      MRN: [id number]  Encounter Provider: TERA Miranda  Encounter Date: 2025   Encounter department: [facility]  :  Assessment & Plan  Bipolar 1 disorder (HCC)  Patient appears manic during encounter.  She is restless, unable to sit still, her speech is rapid and pressured, she is tangential.  She endorses cloudy thinking and inability to rest.  She denies visual or auditory hallucinations.  She denies inadequate sleep.  She denies self-harm or suicidal ideation.   She has been referred to psychiatry for med management.  She is currently participating in the [facility] program.  She is not a danger to herself or others and therefore does not need forced commitment.  However, we did discuss returning to voluntary inpatient psychiatric hospitalization as she appears quite uncontrolled.  Patient concerned about missing future appointments such as dentist, Social Security, and medical assistance.  Patient encouraged that these appointments can be rescheduled and that she needs to think about prioritizing her mental health.   Encouraged to continue with [facility].         Chronic left-sided low back pain without sciatica    Orders:    gabapentin (Neurontin) 100 mg capsule; Take 1 capsule (100 mg total) by mouth 3 (three) times a day           History of Present Illness   Jackie Rose is a 54-year-old female with a history of asthma, bipolar 1 disorder, chronic low back pain, generalized anxiety disorder, lower extremity weakness, sheltered homelessness.    She presents to the office today seeking therapy.  She thought she was coming for a talk therapy session and misunderstood the reason for the visit.    [facility]  It was recommended that her gabapentin 300 mg daily be changed to divided doses of 100 mg 3 times daily.  No prescription was written  "and patient requests the medication.      Review of Systems   Respiratory:  Negative for cough and shortness of breath.    Cardiovascular:  Negative for chest pain and palpitations.   Musculoskeletal:  Positive for back pain.   Psychiatric/Behavioral:  Positive for agitation and decreased concentration. Negative for hallucinations, self-injury, sleep disturbance and suicidal ideas. The patient is nervous/anxious and is hyperactive.    All other systems reviewed and are negative.    Patient Active Problem List   Diagnosis    Mild intermittent asthma, unspecified whether complicated    Bipolar 1 disorder (HCC)    Chronic low back pain    SAIMA (generalized anxiety disorder)    Lower extremity weakness    Sheltered homelessness       Objective   /70 (BP Location: Right arm, Patient Position: Sitting, Cuff Size: Standard)   Pulse 94   Temp 98.2 °F (36.8 °C) (Temporal)   Resp 18   Ht 5' 2\" (1.575 m)   Wt 52.2 kg (115 lb)   LMP  (LMP Unknown)   SpO2 93%   BMI 21.03 kg/m²      Physical Exam  Vitals and nursing note reviewed.   Constitutional:       General: She is not in acute distress.     Appearance: She is well-developed.   HENT:      Head: Normocephalic and atraumatic.   Eyes:      Conjunctiva/sclera: Conjunctivae normal.   Cardiovascular:      Rate and Rhythm: Normal rate and regular rhythm.      Heart sounds: No murmur heard.  Pulmonary:      Effort: Pulmonary effort is normal. No respiratory distress.      Breath sounds: Normal breath sounds.   Abdominal:      Palpations: Abdomen is soft.      Tenderness: There is no abdominal tenderness.   Musculoskeletal:      Cervical back: Neck supple.   Skin:     General: Skin is warm and dry.      Capillary Refill: Capillary refill takes less than 2 seconds.   Neurological:      Mental Status: She is alert.   Psychiatric:         Attention and Perception: She is inattentive. She does not perceive auditory or visual hallucinations.         Mood and Affect: Mood is " anxious.         Speech: Speech is rapid and pressured and tangential.         Behavior: Behavior is hyperactive.         Thought Content: Thought content is not paranoid or delusional. Thought content does not include homicidal or suicidal ideation. Thought content does not include homicidal or suicidal plan.

## 2025-01-24 NOTE — ASSESSMENT & PLAN NOTE
Patient appears manic during encounter.  She is restless, unable to sit still, her speech is rapid and pressured, she is tangential.  She endorses cloudy thinking and inability to rest.  She denies visual or auditory hallucinations.  She denies inadequate sleep.  She denies self-harm or suicidal ideation.   She has been referred to psychiatry for med management.  She is currently participating in the [facility] program.  She is not a danger to herself or others and therefore does not need forced commitment.  However, we did discuss returning to voluntary inpatient psychiatric hospitalization as she appears quite uncontrolled.  Patient concerned about missing future appointments such as dentist, Social Security, and medical assistance.  Patient encouraged that these appointments can be rescheduled and that she needs to think about prioritizing her mental health.   Encouraged to continue with [facility] program.          To get better and follow your care plan as instructed.

## 2025-01-24 NOTE — ASSESSMENT & PLAN NOTE
Orders:    gabapentin (Neurontin) 100 mg capsule; Take 1 capsule (100 mg total) by mouth 3 (three) times a day

## 2025-01-27 ENCOUNTER — PATIENT OUTREACH (OUTPATIENT)
Dept: FAMILY MEDICINE CLINIC | Facility: CLINIC | Age: 55
End: 2025-01-27

## 2025-01-28 ENCOUNTER — PATIENT OUTREACH (OUTPATIENT)
Dept: FAMILY MEDICINE CLINIC | Facility: CLINIC | Age: 55
End: 2025-01-28

## 2025-01-28 ENCOUNTER — TELEPHONE (OUTPATIENT)
Age: 55
End: 2025-01-28

## 2025-01-28 NOTE — TELEPHONE ENCOUNTER
Contacted patient in regards to Routine Referral in attempts to verify patient's needs of services and add patient to proper wait list. spoke with patient whom stated she is interested in  med management. Pt moved to [place] and per patient she applied [place] switch [place] Medical Assistance. Pt has been added to proper MM wait list. Upon scheduling please verify insurance has been actually changed. Closing referral.

## 2025-01-28 NOTE — PROGRESS NOTES
Incoming / Outgoing Texts:  1/28/2025    CMOC received several text messages from Pt with identification and SS card to forward to [employer]. Also included was an award letter from [facility]. CMOC forwarded to [employer] via [place] [employer].     Next outreach is scheduled for 2/4/2025.

## 2025-01-31 ENCOUNTER — PATIENT OUTREACH (OUTPATIENT)
Dept: OTHER | Facility: OTHER | Age: 55
End: 2025-01-31

## 2025-01-31 NOTE — PROGRESS NOTES
Client called asking me to look into her gabapentin order at the pharmacy. This writer called Lisbet at the pharmacy. Lisbet stated the gabapentin wasn't approved by our department for payment because it was sent two days later.  Margret LUJAN approved gabapentin for $11.17. No further outreach scheduled.

## 2025-02-03 ENCOUNTER — PATIENT OUTREACH (OUTPATIENT)
Dept: FAMILY MEDICINE CLINIC | Facility: CLINIC | Age: 55
End: 2025-02-03

## 2025-02-03 NOTE — PROGRESS NOTES
Outgoing Call / Text:  2/3/2025    CMOC returned missed call from Pt. VM left. CMOC also did send text message to Pt as she also communicates with CMOC via texting. Pt had left VM message that she has a copy of her birth certificate as well as her SS card which is being requested by [employer]. CMOC to f/u.     Next outreach is scheduled for 2/4/2025.

## 2025-02-04 ENCOUNTER — PATIENT OUTREACH (OUTPATIENT)
Dept: FAMILY MEDICINE CLINIC | Facility: CLINIC | Age: 55
End: 2025-02-04

## 2025-02-04 NOTE — PROGRESS NOTES
Outgoing Call / Text:  2/4/2025    CMOC called Pt to discuss status of SNAP and MA application. VM left, text message sent as well.     Next outreach is scheduled for 2/11/2025.    Addendum:  Pt called CMOC and agreed to meet at [facility] on 2/6/2025 at 3 PM to call DPW and check on status of MA and SNAP application and to apply for housing.

## 2025-02-05 ENCOUNTER — PATIENT OUTREACH (OUTPATIENT)
Dept: FAMILY MEDICINE CLINIC | Facility: CLINIC | Age: 55
End: 2025-02-05

## 2025-02-05 NOTE — PROGRESS NOTES
Outgoing Call:  2/5/2025    OC called Pt to inform clinic may have a delayed opening or be closing tomorrow due to possible snow storm. CMOC informed Pt she will call her by 9 AM to provide an update and reschedule if needed. Pt expressed understanding and thanked [facility] for the call.     Next outreach is scheduled for 2/6/2025.

## 2025-02-06 ENCOUNTER — PATIENT OUTREACH (OUTPATIENT)
Dept: FAMILY MEDICINE CLINIC | Facility: CLINIC | Age: 55
End: 2025-02-06

## 2025-02-06 NOTE — PROGRESS NOTES
Outgoing Calls:  2/6/2025    [facility] called Pt to reschedule today's appointment to apply for housing due to inclement weather. Pt informs she is sick with bronchitis and is planning on going to the ED since the current medication she is using is not working for her. Pt also informed she did receive a call from [employer] and was informed she was approved for MA beginning next month and believes she was approved for SNAP but is unsure. CMOC offered to call DPW with her to gather additional information and clarification. Pt agreed. CMOC did facilitate a three wall call to DPW and were placed on hold for 29 minutes prior to receiving assistance. While on hold CMOC completed a Promise check online and Pt does have category J medicaid plan. While on hold CMOC did also send message via Epic Chat for clerical team to update Pt's health insurance and this was completed. Once [employer] rep came on the line we were informed Pt was not approved for SNAP or MA as of yet. CMOC informed Pt had received a call from [employer] stating that she was approved for both. CMOC also informed Pt has active MA according to Promise website. DPW rep informed he does not understand why since Pt is not active. CMOC requested a call back from local [employer] office to discuss further. After call ended Pt did text copy of her birth certificate to [facility] and this was forwarded to DPW per her request via email. [facility] did also reach out to financial counselors so that they can complete a thorough check of health coverage and response was received that they will look into it and get back to [facility]. OC to f/u.     Next outreach is scheduled for 2/13/2025.    Addendum:  OC received another call from Pt informing she is attempting to send VM [facility] was approved for [facility] and MA. Pt was able to play message she received however message was an automated message from [place] [employer] informing how to select a health plan and PCP. CMOC informed once her  CW calls she can request to add CMOC to the call to assist. Pt agreed. Pt also informed she spoke with her  today. She states she is on parole in [place]. CMOC did inform Pt this will disqualify her for housing at this time. Pt was upset however expressed understanding. Pt states she was approved for rental assistance with [employer]. She also informed she is working with Pat[employer] who also provided her with a voucher to get a free state ID. CMOC informed Pt she will need to order her birth certificate from [place] in order to request a new state ID in [place]. Pt confirmed she is currently working on this with [employer]. Pt encouraged to continue working closely with [employer].     Addendum:  Message received from financial counselor, Johanne CUEVAS confirming Pt has full MA as of today.

## 2025-02-11 ENCOUNTER — PATIENT OUTREACH (OUTPATIENT)
Dept: FAMILY MEDICINE CLINIC | Facility: CLINIC | Age: 55
End: 2025-02-11

## 2025-02-11 NOTE — PROGRESS NOTES
Incoming Call:  2025    CMOC received a call from Pt and informs she has been approved for SNAP benefits and was informed she can go to the local [facility] Assistance Office and  a new EBT card. Pt states she will go today and  her card. Pt states she did not get full amount of SNAP benefits. CMOC informed possibly because she is not paying rent. CMOC informed this may change once she begins to pay rent. Pt expressed understanding. CMOC discussed applying for [employer] services. CMOC informed Pt application might be denied due to  ID. Pt expressed understanding. CMOC did complete an application via R website. Pt is aware she will receive a call from [employer] to schedule her in person evaluation.     Next outreach is scheduled for 2025.

## 2025-02-12 ENCOUNTER — PATIENT OUTREACH (OUTPATIENT)
Dept: FAMILY MEDICINE CLINIC | Facility: CLINIC | Age: 55
End: 2025-02-12

## 2025-02-13 ENCOUNTER — PATIENT OUTREACH (OUTPATIENT)
Dept: FAMILY MEDICINE CLINIC | Facility: CLINIC | Age: 55
End: 2025-02-13

## 2025-02-13 NOTE — PROGRESS NOTES
Outgoing Calls:  2/13/2025    [facility] discussed referral for [employer] services with Tabitha CHAN. Currently [facility] ordering a birth [facility] and applying for a state ID. [facility] is aware the process can take months. CMOC unable to move forward without said documents. Pt is also currently fleeing from law enforcement in [place] and has two pending court dates. Pt states she will most likely be incarcerated since she is on parole in [place] and left state lines without court approval. Both YURY and Tabitha are in agreement to close referral and reopen once Pt is issued her ID and or birth certificate.     OC called Pt and explained process of applying for [employer] services. Pt is aware nothing can be done with application until she provides requested documents. Pt states she will call [facility] CM team once she receives her documents. Pt thanked OC for the call. Pt is aware this referral will be closed today.     No further outreach is scheduled.

## 2025-02-14 ENCOUNTER — PATIENT OUTREACH (OUTPATIENT)
Dept: FAMILY MEDICINE CLINIC | Facility: CLINIC | Age: 55
End: 2025-02-14

## 2025-02-14 NOTE — PROGRESS NOTES
OP SW had received an in basket from [facility] Lila Alicea yesterday. Per in basket, patient working with [facility] for birth certificate and ID. Per chart, patient explained [facility] services and advised about reaching back to care team once she has documents needed. Lila closed case. OP SW responded to Lila. OP ROCIO closed case. Please reconsult as needed.

## 2025-02-18 ENCOUNTER — PATIENT OUTREACH (OUTPATIENT)
Dept: FAMILY MEDICINE CLINIC | Facility: CLINIC | Age: 55
End: 2025-02-18

## 2025-02-24 ENCOUNTER — TELEPHONE (OUTPATIENT)
Dept: OBGYN CLINIC | Facility: CLINIC | Age: 55
End: 2025-02-24

## 2025-02-25 ENCOUNTER — TELEPHONE (OUTPATIENT)
Age: 55
End: 2025-02-25

## 2025-02-25 ENCOUNTER — TELEPHONE (OUTPATIENT)
Dept: OBGYN CLINIC | Facility: CLINIC | Age: 55
End: 2025-02-25

## 2025-02-25 NOTE — TELEPHONE ENCOUNTER
Pradeep from [facility] calling to assist w/r/s pt for apt. Transferred pt over, line was mute. Unable to hear anyone on the other end, call ended shortly after. Please assist w/ r/s if pt calls back.

## 2025-02-25 NOTE — TELEPHONE ENCOUNTER
"Patient called and left voicemail:    \"Hi this is Jackie over there 1970. Phone number is [phone]. I would like to change my primary doctor and I would like to also make an appointment for an MRI or X-ray. Can you please give me a call at your earliest convenience? The number is [phone]. Thank you Katy ly.\"    Called patient back and gave correct phone number for Family Medicine.     "

## 2025-02-27 ENCOUNTER — TELEPHONE (OUTPATIENT)
Dept: FAMILY MEDICINE CLINIC | Facility: CLINIC | Age: 55
End: 2025-02-27

## 2025-02-27 NOTE — TELEPHONE ENCOUNTER
Last time she was evaluated there was no indication for imaging. If something has changed, she needs to be reevaluated.

## 2025-02-27 NOTE — TELEPHONE ENCOUNTER
PT called the nurse line requesting an order be put in for an MRI or an X-ray for her lower back pain.    Please advise.

## 2025-03-12 ENCOUNTER — OFFICE VISIT (OUTPATIENT)
Dept: FAMILY MEDICINE CLINIC | Facility: CLINIC | Age: 55
End: 2025-03-12

## 2025-03-12 VITALS
TEMPERATURE: 97 F | RESPIRATION RATE: 16 BRPM | HEART RATE: 74 BPM | SYSTOLIC BLOOD PRESSURE: 120 MMHG | OXYGEN SATURATION: 97 % | DIASTOLIC BLOOD PRESSURE: 70 MMHG | HEIGHT: 62 IN | WEIGHT: 116.6 LBS | BODY MASS INDEX: 21.46 KG/M2

## 2025-03-12 DIAGNOSIS — M54.50 CHRONIC LEFT-SIDED LOW BACK PAIN WITHOUT SCIATICA: ICD-10-CM

## 2025-03-12 DIAGNOSIS — G89.29 CHRONIC LEFT-SIDED LOW BACK PAIN WITHOUT SCIATICA: ICD-10-CM

## 2025-03-12 DIAGNOSIS — Z00.00 HEALTHCARE MAINTENANCE: Primary | ICD-10-CM

## 2025-03-12 PROCEDURE — 99213 OFFICE O/P EST LOW 20 MIN: CPT

## 2025-03-12 RX ORDER — DIPHENOXYLATE HYDROCHLORIDE AND ATROPINE SULFATE 2.5; .025 MG/1; MG/1
1 TABLET ORAL DAILY
Qty: 30 TABLET | Refills: 11 | Status: SHIPPED | OUTPATIENT
Start: 2025-03-12

## 2025-03-12 RX ORDER — LITHIUM CARBONATE 300 MG/1
1 TABLET, FILM COATED, EXTENDED RELEASE ORAL 2 TIMES DAILY
COMMUNITY
Start: 2025-01-10

## 2025-03-12 RX ORDER — GABAPENTIN 300 MG/1
300 CAPSULE ORAL 3 TIMES DAILY
Qty: 90 CAPSULE | Refills: 1 | Status: SHIPPED | OUTPATIENT
Start: 2025-03-12

## 2025-03-12 NOTE — ASSESSMENT & PLAN NOTE
No red flags on exam.    Patient with extensive history of chronic low back pain.   She was last evaluated by neurosurgery in [place] in 01/05/2022. Since then she has been in and out of the ED for mental health and other complaints and has not had recent treatment.     She is s/p L5 laminectomy in 2010. NABOR last performed September 2022 and was moderately helpful. She was then seen by pain management and referred to Dr. Stephens for SCS who then referred her to neurospine clinic . 2022 Imaging was significant for bilateral foraminal stenosis at L5 and S1. 2022 CT showed degenerative facet changes and no spondylosis.     She was previously started on gabapentin 100 mg TID at her last visit, but reports today that this has not been helpful. Will increase to 300 mg TID.   Will also give referral to [facility] for further evaluation and management.     ED precautions given.     Orders:    gabapentin (Neurontin) 300 mg capsule; Take 1 capsule (300 mg total) by mouth 3 (three) times a day    Ambulatory Referral to [facility]; Future

## 2025-03-12 NOTE — PROGRESS NOTES
"Name: Jackie Rose      : 1970      MRN: [id number]  Encounter Provider: TERA Miranda  Encounter Date: 3/12/2025   Encounter department: [facility]  :  Assessment & Plan  Chronic left-sided low back pain without sciatica  No red flags on exam.    Patient with extensive history of chronic low back pain.   She was last evaluated by neurosurgery in [place] in 2022. Since then she has been in and out of the ED for mental health and other complaints and has not had recent treatment.     She is s/p L5 laminectomy in . NABOR last performed 2022 and was moderately helpful. She was then seen by pain management and referred to Dr. Stephens for SCS who then referred her to neurospine clinic .  Imaging was significant for bilateral foraminal stenosis at L5 and S1.  CT showed degenerative facet changes and no spondylosis.     She was previously started on gabapentin 100 mg TID at her last visit, but reports today that this has not been helpful. Will increase to 300 mg TID.   Will also give referral to [facility] for further evaluation and management.     ED precautions given.     Orders:    gabapentin (Neurontin) 300 mg capsule; Take 1 capsule (300 mg total) by mouth 3 (three) times a day    Ambulatory Referral to [facility]; Future    Healthcare maintenance    Orders:    multivitamin (THERAGRAN) TABS; Take 1 tablet by mouth daily           History of Present Illness         Jackie Rose is a 54-year-old female with a history of asthma, bipolar 1 disorder, chronic low back pain, generalized anxiety disorder, lower extremity weakness, sheltered homelessness.    She presents to the office today for complaint of back pain. The back pain has not changed since her last assessment and she reports her pain as her \"usual back pain\", but has so far been unrelieved by the increase in gabapentin to 100 mg TID. She denies any " "weakness, loss of bowel or bladder control, perianal numbness.             Review of Systems   Respiratory:  Negative for cough and shortness of breath.    Cardiovascular:  Negative for chest pain and palpitations.   Gastrointestinal:  Negative for abdominal pain.   Musculoskeletal:  Positive for back pain.   Neurological:  Negative for weakness and numbness.       Objective   /70 (BP Location: Right arm, Patient Position: Sitting, Cuff Size: Standard)   Pulse 74   Temp (!) 97 °F (36.1 °C) (Temporal)   Resp 16   Ht 5' 2\" (1.575 m)   Wt 52.9 kg (116 lb 9.6 oz)   LMP  (LMP Unknown)   SpO2 97%   BMI 21.33 kg/m²      Physical Exam  Vitals and nursing note reviewed.   Constitutional:       General: She is not in acute distress.     Appearance: She is well-developed.   HENT:      Head: Normocephalic and atraumatic.   Eyes:      Conjunctiva/sclera: Conjunctivae normal.   Cardiovascular:      Rate and Rhythm: Normal rate and regular rhythm.      Heart sounds: No murmur heard.  Pulmonary:      Effort: Pulmonary effort is normal. No respiratory distress.      Breath sounds: Normal breath sounds.   Abdominal:      Palpations: Abdomen is soft.      Tenderness: There is no abdominal tenderness.   Musculoskeletal:         General: No swelling.      Cervical back: Neck supple.      Lumbar back: Tenderness present. Decreased range of motion. Positive right straight leg raise test and positive left straight leg raise test.   Skin:     General: Skin is warm and dry.      Capillary Refill: Capillary refill takes less than 2 seconds.   Neurological:      Mental Status: She is alert.   Psychiatric:         Mood and Affect: Mood normal.         "

## 2025-04-22 ENCOUNTER — OFFICE VISIT (OUTPATIENT)
Dept: FAMILY MEDICINE CLINIC | Facility: CLINIC | Age: 55
End: 2025-04-22

## 2025-04-22 ENCOUNTER — TELEPHONE (OUTPATIENT)
Dept: FAMILY MEDICINE CLINIC | Facility: CLINIC | Age: 55
End: 2025-04-22

## 2025-04-22 VITALS
BODY MASS INDEX: 20.61 KG/M2 | RESPIRATION RATE: 18 BRPM | OXYGEN SATURATION: 97 % | TEMPERATURE: 98 F | SYSTOLIC BLOOD PRESSURE: 104 MMHG | WEIGHT: 112 LBS | HEIGHT: 62 IN | HEART RATE: 105 BPM | DIASTOLIC BLOOD PRESSURE: 72 MMHG

## 2025-04-22 DIAGNOSIS — J30.2 SEASONAL ALLERGIES: ICD-10-CM

## 2025-04-22 DIAGNOSIS — F41.1 GAD (GENERALIZED ANXIETY DISORDER): ICD-10-CM

## 2025-04-22 DIAGNOSIS — J06.9 UPPER RESPIRATORY TRACT INFECTION, UNSPECIFIED TYPE: ICD-10-CM

## 2025-04-22 DIAGNOSIS — G89.29 CHRONIC BILATERAL LOW BACK PAIN WITHOUT SCIATICA: ICD-10-CM

## 2025-04-22 DIAGNOSIS — M54.50 CHRONIC BILATERAL LOW BACK PAIN WITHOUT SCIATICA: ICD-10-CM

## 2025-04-22 DIAGNOSIS — J45.20 MILD INTERMITTENT ASTHMA, UNSPECIFIED WHETHER COMPLICATED: ICD-10-CM

## 2025-04-22 DIAGNOSIS — F31.9 BIPOLAR 1 DISORDER (HCC): Primary | ICD-10-CM

## 2025-04-22 DIAGNOSIS — Z00.00 HEALTHCARE MAINTENANCE: ICD-10-CM

## 2025-04-22 PROCEDURE — 99213 OFFICE O/P EST LOW 20 MIN: CPT | Performed by: FAMILY MEDICINE

## 2025-04-22 RX ORDER — IBUPROFEN 600 MG/1
600 TABLET, FILM COATED ORAL EVERY 6 HOURS PRN
Qty: 30 TABLET | Refills: 0 | Status: SHIPPED | OUTPATIENT
Start: 2025-04-22

## 2025-04-22 RX ORDER — HYDROXYZINE HYDROCHLORIDE 25 MG/1
25 TABLET, FILM COATED ORAL EVERY 8 HOURS PRN
Qty: 30 TABLET | Refills: 0 | Status: SHIPPED | OUTPATIENT
Start: 2025-04-22 | End: 2025-05-22

## 2025-04-22 RX ORDER — RISPERIDONE 2 MG/1
1 TABLET ORAL
Qty: 15 TABLET | Refills: 0 | Status: SHIPPED | OUTPATIENT
Start: 2025-04-22

## 2025-04-22 RX ORDER — DIPHENOXYLATE HYDROCHLORIDE AND ATROPINE SULFATE 2.5; .025 MG/1; MG/1
1 TABLET ORAL DAILY
Qty: 30 TABLET | Refills: 11 | Status: SHIPPED | OUTPATIENT
Start: 2025-04-22

## 2025-04-22 RX ORDER — PROPRANOLOL HYDROCHLORIDE 10 MG/1
10 TABLET ORAL 2 TIMES DAILY
Qty: 60 TABLET | Refills: 0 | Status: CANCELLED | OUTPATIENT
Start: 2025-04-22

## 2025-04-22 RX ORDER — RISPERIDONE 2 MG/1
2 TABLET ORAL 2 TIMES DAILY
Qty: 60 TABLET | Refills: 0 | Status: CANCELLED | OUTPATIENT
Start: 2025-04-22

## 2025-04-22 RX ORDER — LIDOCAINE 50 MG/G
1 PATCH TOPICAL DAILY
Qty: 30 PATCH | Refills: 1 | Status: SHIPPED | OUTPATIENT
Start: 2025-04-22

## 2025-04-22 RX ORDER — LITHIUM CARBONATE 300 MG/1
300 TABLET, FILM COATED, EXTENDED RELEASE ORAL 2 TIMES DAILY
Qty: 60 TABLET | Refills: 1 | Status: CANCELLED | OUTPATIENT
Start: 2025-04-22

## 2025-04-22 RX ORDER — ALBUTEROL SULFATE 90 UG/1
2 INHALANT RESPIRATORY (INHALATION) EVERY 4 HOURS PRN
Qty: 8 G | Refills: 0 | Status: SHIPPED | OUTPATIENT
Start: 2025-04-22

## 2025-04-22 RX ORDER — FLUTICASONE PROPIONATE 50 MCG
1 SPRAY, SUSPENSION (ML) NASAL DAILY
Qty: 16 G | Refills: 0 | Status: SHIPPED | OUTPATIENT
Start: 2025-04-22

## 2025-04-22 NOTE — ASSESSMENT & PLAN NOTE
Previously prescribed propanolol 10mg BID.   Has not been taking medications for 2 to 3 weeks.  Will trial more effective medication.     Plan:   Will trial atarax 25 mg as needed.   Consider alternate regimen if no adequate control of symptoms.   Follow-up in 2 weeks.    Orders:    hydrOXYzine HCL (ATARAX) 25 mg tablet; Take 1 tablet (25 mg total) by mouth every 8 (eight) hours as needed for itching or anxiety

## 2025-04-22 NOTE — PATIENT INSTRUCTIONS
Risperidal should be taken half pill once a day at night for 5 days. Can increase to 1mg (full pill) once a night for 5 days. And then 2mg once a day for 5 days.   Take hydroxyzine PRN for anxiety.   Follow up in 2 weeks.

## 2025-04-22 NOTE — ASSESSMENT & PLAN NOTE
Stable, not in acute exacerbation.   Has been without albuterol use however does not have inhaler currently.     Plan:   Courtesy refill.   Would benefit from evaluation of symptoms for classification and need for additional inhalers.   FU w/PCP in 2 weeks.     Orders:    albuterol (Proventil HFA) 90 mcg/act inhaler; Inhale 2 puffs every 4 (four) hours as needed for wheezing

## 2025-04-22 NOTE — ASSESSMENT & PLAN NOTE
She has been referred to psychiatry for med management without adequate follow up.   She is currently participating in the [facility] program.  Currently off of medication for the past 2-3 weeks.   Likely in manic state, denies need for voluntary hospitalization. She is not a danger to herself or others, therefore no need for forced commitment.  Patient needed to be redirected during encounter as she became inappropriate using foul language.  Patient was instructed to take a few minutes to cool off upon return to the exam room she apologized and was amenable to the plan forward.         Plan:   Risperidal 0.5mg daily nightly for 5 days, 1mg daily for 5 days, and then 2mg daily for 5 days. Advance as tolerated.   FU w/me in 2 weeks.   Educated about medication adherence and the effects of medications on her current mood.   Encouraged to continue with [facility].  Educated about the importance of appointment follow up.   Educated the patient about the importance of completing labs that were ordered at last visit.    Orders:    risperiDONE (RisperDAL) 2 mg tablet; Take 0.5 tablets (1 mg total) by mouth daily at bedtime

## 2025-04-22 NOTE — ASSESSMENT & PLAN NOTE
She is s/p L5 laminectomy in 2010. NABOR last performed September 2022 and was moderately helpful. She was then seen by pain management and referred to Dr. Stephens for SCS who then referred her to neurospine clinic . 2022 Imaging was significant for bilateral foraminal stenosis at L5 and S1. 2022 CT showed degenerative facet changes and no spondylosis.   No red flag symptoms on PE and ROS today.     Plan:   Courtesy refill.   Advised patient to follow up with [facility].  Follow-up in 2 weeks    Orders:    ibuprofen (MOTRIN) 600 mg tablet; Take 1 tablet (600 mg total) by mouth every 6 (six) hours as needed for moderate pain    lidocaine (Lidoderm) 5 %; Apply 1 patch topically over 12 hours daily Remove & Discard patch within 12 hours or as directed by MD

## 2025-04-22 NOTE — PROGRESS NOTES
Name: Jackie Rose      : 1970      MRN: [id number]  Encounter Provider: Tracy Daigle MD  Encounter Date: 2025   Encounter department: [facility]  :  Assessment & Plan  Bipolar 1 disorder (HCC)  She has been referred to psychiatry for med management without adequate follow up.   She is currently participating in the [facility] program.  Currently off of medication for the past 2-3 weeks.   Likely in manic state, denies need for voluntary hospitalization. She is not a danger to herself or others, therefore no need for forced commitment.  Patient needed to be redirected during encounter as she became inappropriate using foul language.  Patient was instructed to take a few minutes to cool off upon return to the exam room she apologized and was amenable to the plan forward.         Plan:   Risperidal 0.5mg daily nightly for 5 days, 1mg daily for 5 days, and then 2mg daily for 5 days. Advance as tolerated.   FU w/me in 2 weeks.   Educated about medication adherence and the effects of medications on her current mood.   Encouraged to continue with [facility].  Educated about the importance of appointment follow up.   Educated the patient about the importance of completing labs that were ordered at last visit.    Orders:    risperiDONE (RisperDAL) 2 mg tablet; Take 0.5 tablets (1 mg total) by mouth daily at bedtime    SAIMA (generalized anxiety disorder)  Previously prescribed propanolol 10mg BID.   Has not been taking medications for 2 to 3 weeks.  Will trial more effective medication.     Plan:   Will trial atarax 25 mg as needed.   Consider alternate regimen if no adequate control of symptoms.   Follow-up in 2 weeks.    Orders:    hydrOXYzine HCL (ATARAX) 25 mg tablet; Take 1 tablet (25 mg total) by mouth every 8 (eight) hours as needed for itching or anxiety    Mild intermittent asthma, unspecified whether complicated  Stable, not in acute exacerbation.   Has  been without albuterol use however does not have inhaler currently.     Plan:   Courtesy refill.   Would benefit from evaluation of symptoms for classification and need for additional inhalers.   FU w/PCP in 2 weeks.     Orders:    albuterol (Proventil HFA) 90 mcg/act inhaler; Inhale 2 puffs every 4 (four) hours as needed for wheezing    Chronic bilateral low back pain without sciatica  She is s/p L5 laminectomy in 2010. NABOR last performed September 2022 and was moderately helpful. She was then seen by pain management and referred to Dr. Stephens for SCS who then referred her to neurospine clinic . 2022 Imaging was significant for bilateral foraminal stenosis at L5 and S1. 2022 CT showed degenerative facet changes and no spondylosis.   No red flag symptoms on PE and ROS today.     Plan:   Courtesy refill.   Advised patient to follow up with [facility].  Follow-up in 2 weeks    Orders:    ibuprofen (MOTRIN) 600 mg tablet; Take 1 tablet (600 mg total) by mouth every 6 (six) hours as needed for moderate pain    lidocaine (Lidoderm) 5 %; Apply 1 patch topically over 12 hours daily Remove & Discard patch within 12 hours or as directed by MD    Healthcare maintenance  Courtesy refill.     Orders:    multivitamin (THERAGRAN) TABS; Take 1 tablet by mouth daily    Seasonal allergies  Courtesy refill.   Orders:    fluticasone (FLONASE) 50 mcg/act nasal spray; 1 spray into each nostril daily           History of Present Illness   54 y/o female w/PMH of Bipolar disorder, SAIMA, and astma presents for same day visit requesting refills to her medications. She states that she has been off all medications for the last three weeks. Patient endorses decreased need for sleep, flight of ideas. Denies excessive involvement in risky activities, marked impairment of functioning/hospitlization, substance abuse. Denies any SI/HI/AH/VH.      The patient lives alone, on SSI. She states the most important thing to her at this time  "in her life is her kids who live in [place], her health, and her housing.       Review of Systems   Constitutional:  Negative for chills, fever and unexpected weight change.   HENT:  Negative for sore throat.    Eyes:  Negative for visual disturbance.   Respiratory:  Negative for cough, chest tightness and shortness of breath.    Cardiovascular:  Negative for chest pain, palpitations and leg swelling.   Gastrointestinal:  Negative for abdominal pain, constipation, diarrhea, nausea and vomiting.   Skin:  Negative for rash.   Neurological:  Negative for dizziness, weakness, light-headedness, numbness and headaches.   Psychiatric/Behavioral:  Positive for behavioral problems, decreased concentration, dysphoric mood and sleep disturbance. Negative for hallucinations, self-injury and suicidal ideas. The patient is nervous/anxious and is hyperactive.        Objective   /72 (BP Location: Left arm, Patient Position: Sitting, Cuff Size: Standard)   Pulse 105   Temp 98 °F (36.7 °C) (Temporal)   Resp 18   Ht 5' 2\" (1.575 m)   Wt 50.8 kg (112 lb)   LMP  (LMP Unknown)   SpO2 97%   BMI 20.49 kg/m²      Physical Exam  Vitals and nursing note reviewed.   Constitutional:       Appearance: She is not ill-appearing or toxic-appearing.   HENT:      Right Ear: External ear normal.      Left Ear: External ear normal.      Nose: Nose normal. No congestion or rhinorrhea.   Eyes:      General: No scleral icterus.        Right eye: No discharge.         Left eye: No discharge.      Extraocular Movements: Extraocular movements intact.      Conjunctiva/sclera: Conjunctivae normal.      Pupils: Pupils are equal, round, and reactive to light.   Cardiovascular:      Rate and Rhythm: Normal rate and regular rhythm.      Pulses: Normal pulses.      Heart sounds: Normal heart sounds. No murmur heard.  Pulmonary:      Effort: Pulmonary effort is normal. No respiratory distress.      Breath sounds: Normal breath sounds. No wheezing, " rhonchi or rales.   Chest:      Chest wall: No tenderness.   Abdominal:      General: There is no distension.      Palpations: Abdomen is soft.      Tenderness: There is no abdominal tenderness. There is no guarding or rebound.   Musculoskeletal:      Cervical back: Normal range of motion. No rigidity.      Right lower leg: No edema.      Left lower leg: No edema.   Skin:     Findings: Rash present.   Neurological:      Mental Status: She is alert.   Psychiatric:         Attention and Perception: She is inattentive.         Mood and Affect: Mood is anxious. Affect is blunt, angry and inappropriate.         Speech: Speech is rapid and pressured and tangential.         Behavior: Behavior is agitated, aggressive and hyperactive.         Thought Content: Thought content is paranoid. Thought content does not include homicidal or suicidal ideation.

## 2025-05-06 ENCOUNTER — OFFICE VISIT (OUTPATIENT)
Dept: FAMILY MEDICINE CLINIC | Facility: CLINIC | Age: 55
End: 2025-05-06

## 2025-05-06 ENCOUNTER — APPOINTMENT (OUTPATIENT)
Dept: LAB | Facility: HOSPITAL | Age: 55
End: 2025-05-06
Payer: COMMERCIAL

## 2025-05-06 VITALS
TEMPERATURE: 98.1 F | DIASTOLIC BLOOD PRESSURE: 66 MMHG | WEIGHT: 113.4 LBS | OXYGEN SATURATION: 96 % | SYSTOLIC BLOOD PRESSURE: 104 MMHG | HEIGHT: 62 IN | BODY MASS INDEX: 20.87 KG/M2 | HEART RATE: 78 BPM | RESPIRATION RATE: 18 BRPM

## 2025-05-06 DIAGNOSIS — Z11.3 SCREENING EXAMINATION FOR STI: ICD-10-CM

## 2025-05-06 DIAGNOSIS — F31.9 BIPOLAR 1 DISORDER (HCC): ICD-10-CM

## 2025-05-06 DIAGNOSIS — F41.1 GAD (GENERALIZED ANXIETY DISORDER): ICD-10-CM

## 2025-05-06 DIAGNOSIS — G89.29 CHRONIC LEFT-SIDED LOW BACK PAIN WITH LEFT-SIDED SCIATICA: Primary | ICD-10-CM

## 2025-05-06 DIAGNOSIS — M54.42 CHRONIC LEFT-SIDED LOW BACK PAIN WITH LEFT-SIDED SCIATICA: Primary | ICD-10-CM

## 2025-05-06 LAB
ALBUMIN SERPL BCG-MCNC: 4.6 G/DL (ref 3.5–5)
ALP SERPL-CCNC: 63 U/L (ref 34–104)
ALT SERPL W P-5'-P-CCNC: 12 U/L (ref 7–52)
ANION GAP SERPL CALCULATED.3IONS-SCNC: 7 MMOL/L (ref 4–13)
AST SERPL W P-5'-P-CCNC: 19 U/L (ref 13–39)
BASOPHILS # BLD AUTO: 0.03 THOUSANDS/ÂΜL (ref 0–0.1)
BASOPHILS NFR BLD AUTO: 1 % (ref 0–1)
BILIRUB SERPL-MCNC: 0.27 MG/DL (ref 0.2–1)
BUN SERPL-MCNC: 21 MG/DL (ref 5–25)
CALCIUM SERPL-MCNC: 9.3 MG/DL (ref 8.4–10.2)
CHLORIDE SERPL-SCNC: 104 MMOL/L (ref 96–108)
CHOLEST SERPL-MCNC: 175 MG/DL (ref ?–200)
CO2 SERPL-SCNC: 28 MMOL/L (ref 21–32)
CREAT SERPL-MCNC: 0.78 MG/DL (ref 0.6–1.3)
EOSINOPHIL # BLD AUTO: 0.02 THOUSAND/ÂΜL (ref 0–0.61)
EOSINOPHIL NFR BLD AUTO: 0 % (ref 0–6)
ERYTHROCYTE [DISTWIDTH] IN BLOOD BY AUTOMATED COUNT: 13.9 % (ref 11.6–15.1)
EST. AVERAGE GLUCOSE BLD GHB EST-MCNC: 123 MG/DL
GFR SERPL CREATININE-BSD FRML MDRD: 86 ML/MIN/1.73SQ M
GLUCOSE SERPL-MCNC: 86 MG/DL (ref 65–140)
HBA1C MFR BLD: 5.9 %
HCT VFR BLD AUTO: 37.5 % (ref 34.8–46.1)
HDLC SERPL-MCNC: 75 MG/DL
HGB BLD-MCNC: 11.7 G/DL (ref 11.5–15.4)
IMM GRANULOCYTES # BLD AUTO: 0.02 THOUSAND/UL (ref 0–0.2)
IMM GRANULOCYTES NFR BLD AUTO: 0 % (ref 0–2)
LDLC SERPL CALC-MCNC: 85 MG/DL (ref 0–100)
LYMPHOCYTES # BLD AUTO: 2.27 THOUSANDS/ÂΜL (ref 0.6–4.47)
LYMPHOCYTES NFR BLD AUTO: 36 % (ref 14–44)
MCH RBC QN AUTO: 29.3 PG (ref 26.8–34.3)
MCHC RBC AUTO-ENTMCNC: 31.2 G/DL (ref 31.4–37.4)
MCV RBC AUTO: 94 FL (ref 82–98)
MONOCYTES # BLD AUTO: 0.47 THOUSAND/ÂΜL (ref 0.17–1.22)
MONOCYTES NFR BLD AUTO: 7 % (ref 4–12)
NEUTROPHILS # BLD AUTO: 3.53 THOUSANDS/ÂΜL (ref 1.85–7.62)
NEUTS SEG NFR BLD AUTO: 56 % (ref 43–75)
NONHDLC SERPL-MCNC: 100 MG/DL
NRBC BLD AUTO-RTO: 0 /100 WBCS
PLATELET # BLD AUTO: 234 THOUSANDS/UL (ref 149–390)
PMV BLD AUTO: 10.7 FL (ref 8.9–12.7)
POTASSIUM SERPL-SCNC: 3.7 MMOL/L (ref 3.5–5.3)
PROT SERPL-MCNC: 7.2 G/DL (ref 6.4–8.4)
RBC # BLD AUTO: 4 MILLION/UL (ref 3.81–5.12)
SODIUM SERPL-SCNC: 139 MMOL/L (ref 135–147)
TRIGL SERPL-MCNC: 75 MG/DL (ref ?–150)
TSH SERPL DL<=0.05 MIU/L-ACNC: 0.68 UIU/ML (ref 0.45–4.5)
WBC # BLD AUTO: 6.34 THOUSAND/UL (ref 4.31–10.16)

## 2025-05-06 PROCEDURE — 83036 HEMOGLOBIN GLYCOSYLATED A1C: CPT

## 2025-05-06 PROCEDURE — 87389 HIV-1 AG W/HIV-1&-2 AB AG IA: CPT

## 2025-05-06 PROCEDURE — 85025 COMPLETE CBC W/AUTO DIFF WBC: CPT

## 2025-05-06 PROCEDURE — 86780 TREPONEMA PALLIDUM: CPT

## 2025-05-06 PROCEDURE — 86592 SYPHILIS TEST NON-TREP QUAL: CPT

## 2025-05-06 PROCEDURE — 86803 HEPATITIS C AB TEST: CPT

## 2025-05-06 PROCEDURE — 99213 OFFICE O/P EST LOW 20 MIN: CPT | Performed by: FAMILY MEDICINE

## 2025-05-06 PROCEDURE — 80061 LIPID PANEL: CPT

## 2025-05-06 PROCEDURE — 84443 ASSAY THYROID STIM HORMONE: CPT

## 2025-05-06 PROCEDURE — 36415 COLL VENOUS BLD VENIPUNCTURE: CPT

## 2025-05-06 PROCEDURE — 80053 COMPREHEN METABOLIC PANEL: CPT

## 2025-05-06 RX ORDER — RISPERIDONE 2 MG/1
2 TABLET ORAL
Qty: 30 TABLET | Refills: 0 | Status: SHIPPED | OUTPATIENT
Start: 2025-05-06

## 2025-05-06 RX ORDER — PREDNISONE 20 MG/1
40 TABLET ORAL DAILY
Qty: 10 TABLET | Refills: 0 | Status: SHIPPED | OUTPATIENT
Start: 2025-05-06 | End: 2025-05-11

## 2025-05-06 RX ORDER — HYDROXYZINE HYDROCHLORIDE 25 MG/1
25 TABLET, FILM COATED ORAL EVERY 8 HOURS PRN
Qty: 90 TABLET | Refills: 0 | Status: SHIPPED | OUTPATIENT
Start: 2025-05-06 | End: 2025-06-05

## 2025-05-07 ENCOUNTER — TELEPHONE (OUTPATIENT)
Dept: FAMILY MEDICINE CLINIC | Facility: CLINIC | Age: 55
End: 2025-05-07

## 2025-05-07 LAB
HCV AB SER QL: NORMAL
HIV 1+2 AB+HIV1 P24 AG SERPL QL IA: NORMAL
RPR SER QL: NORMAL
TREPONEMA PALLIDUM IGG+IGM AB [PRESENCE] IN SERUM OR PLASMA BY IMMUNOASSAY: REACTIVE

## 2025-05-07 NOTE — PROGRESS NOTES
Name: Jackie Rose      : 1970      MRN: [id number]  Encounter Provider: Tracy Daigle MD  Encounter Date: 2025   Encounter department: [facility]  :  Assessment & Plan  Chronic left-sided low back pain with left-sided sciatica  She is s/p L5 laminectomy in . NABOR last performed 2022 and was moderately helpful. She was then seen by pain management and referred to Dr. Stephens for SCS who then referred her to neurospine clinic .  Imaging was significant for bilateral foraminal stenosis at L5 and S1.  CT showed degenerative facet changes and no spondylosis.   Patient endorses minimal relief since starting NSAID course last week.   States gabapentin does not help and attributes worsening anxiety/wakefulness to the medication.   Denies any red flag symptoms of low back pain.  Endorses significant amount of pain with standing, sitting, and laying down.  Symptoms are likely contributing to disrupted sleep.  Likely acute on chronic flare of her sciatica    Plan:  Prednisone 40 mg burst for 5 days.  Patient instructed not to take NSAIDs during prednisone burst treatment.  Discontinue gabapentin   Patient encouraged to follow-up with comprehensive spine PT and [facility].  Lumbar back brace.  Return to ED precautions discussed with the patient.   Follow-up in 1 week.     Orders:    Ambulatory Referral to [facility]; Future    Ambulatory Referral to [facility]; Future    Lumbar Back Brace    predniSONE 20 mg tablet; Take 2 tablets (40 mg total) by mouth daily for 5 days    Bipolar 1 disorder (HCC)  Patient was started on mood stabilizer during her last visit (last week).  Prior to that she had been off of medication for the past 2 to 3 weeks.  During our last visit she was in a likely manic state however no indication for voluntary/involuntary hospitalization.  She was not a danger to herself or others.  Today the  patient endorses better sleeping, does not have pressured speech,and is easily redirectable.   Structured to start Risperdal 0.5 mg daily for 5 days, 1 mg daily for 5 days, and then followed by 2 mg daily for 5 days.  States that this has been improving her sleep as well as her symptoms.      Plan:  Continue Risperdal 2 mg daily.  Follow-up in 1 week.     Orders:    risperiDONE (RisperDAL) 2 mg tablet; Take 1 tablet (2 mg total) by mouth daily at bedtime    SAIMA (generalized anxiety disorder)  Patient states that she feels anxious, however never picked up her medication given to her at last visit.  States that it was not available at the pharmacy.    Plan:  Courtesy refill, Atarax represcribed for the patient.  Patient instructed if she has trouble picking up her medication she should reach out to the office.  Orders:    hydrOXYzine HCL (ATARAX) 25 mg tablet; Take 1 tablet (25 mg total) by mouth every 8 (eight) hours as needed for anxiety           History of Present Illness   54 y/o female w/PMH of Bipolar disorder, SAIMA, and chronic sciatica presents for follow up visit for management of her chronic conditions.         Review of Systems   Constitutional:  Negative for chills and fever.   HENT:  Negative for ear pain and sore throat.    Eyes:  Negative for visual disturbance.   Respiratory:  Negative for cough, chest tightness and shortness of breath.    Cardiovascular:  Negative for chest pain, palpitations and leg swelling.   Gastrointestinal:  Negative for abdominal pain, constipation, diarrhea, nausea and vomiting.   Genitourinary:  Negative for difficulty urinating, dysuria, flank pain, frequency, hematuria, pelvic pain and urgency.   Musculoskeletal:  Positive for back pain (LBP w/radiation down her left leg. Above her baseline). Negative for arthralgias.   Skin:  Negative for color change and rash.   Neurological:  Negative for seizures and syncope.   All other systems reviewed and are negative.      Objective  "  /66 (BP Location: Right arm, Patient Position: Sitting, Cuff Size: Standard)   Pulse 78   Temp 98.1 °F (36.7 °C) (Temporal)   Resp 18   Ht 5' 2\" (1.575 m)   Wt 51.4 kg (113 lb 6.4 oz)   LMP  (LMP Unknown)   SpO2 96%   BMI 20.74 kg/m²      Physical Exam  Vitals and nursing note reviewed.   Constitutional:       General: She is not in acute distress.     Appearance: Normal appearance. She is normal weight. She is not ill-appearing or toxic-appearing.   HENT:      Head: Normocephalic and atraumatic.      Right Ear: External ear normal.      Left Ear: External ear normal.      Nose: Nose normal.   Eyes:      General: No scleral icterus.        Right eye: No discharge.         Left eye: No discharge.      Extraocular Movements: Extraocular movements intact.      Conjunctiva/sclera: Conjunctivae normal.   Cardiovascular:      Rate and Rhythm: Normal rate and regular rhythm.      Heart sounds: Normal heart sounds. No murmur heard.  Pulmonary:      Effort: Pulmonary effort is normal. No respiratory distress.      Breath sounds: Normal breath sounds. No wheezing, rhonchi or rales.   Chest:      Chest wall: No tenderness.   Abdominal:      General: Abdomen is flat. Bowel sounds are normal. There is no distension.      Palpations: Abdomen is soft.      Tenderness: There is no abdominal tenderness. There is no guarding or rebound.   Musculoskeletal:      Cervical back: Normal range of motion.      Right lower leg: No edema.      Left lower leg: No edema.      Comments: Full ROM.  Mild tenderness at mid lumbar region to sacral region .  No paraspinal muscle tenderness.   No palpable step off or deformities.  No signs of trauma.   Straight leg positive on the left, eliciting significant pain.    Neurological:      General: No focal deficit present.      Mental Status: She is alert and oriented to person, place, and time.      Cranial Nerves: No cranial nerve deficit.      Sensory: No sensory deficit.      Motor: " No weakness.      Gait: Gait abnormal (left antalgic gait).   Psychiatric:         Thought Content: Thought content normal.

## 2025-05-07 NOTE — ASSESSMENT & PLAN NOTE
She is s/p L5 laminectomy in 2010. NABOR last performed September 2022 and was moderately helpful. She was then seen by pain management and referred to Dr. Stephens for SCS who then referred her to neurospine clinic . 2022 Imaging was significant for bilateral foraminal stenosis at L5 and S1. 2022 CT showed degenerative facet changes and no spondylosis.   Patient endorses minimal relief since starting NSAID course last week.   States gabapentin does not help and attributes worsening anxiety/wakefulness to the medication.   Denies any red flag symptoms of low back pain.  Endorses significant amount of pain with standing, sitting, and laying down.  Symptoms are likely contributing to disrupted sleep.  Likely acute on chronic flare of her sciatica    Plan:  Prednisone 40 mg burst for 5 days.  Patient instructed not to take NSAIDs during prednisone burst treatment.  Discontinue gabapentin   Patient encouraged to follow-up with comprehensive spine PT and [facility] program.  Lumbar back brace.  Return to ED precautions discussed with the patient.   Follow-up in 1 week.     Orders:    Ambulatory Referral to [facility]; Future    Ambulatory Referral to [facility]; Future    Lumbar Back Brace    predniSONE 20 mg tablet; Take 2 tablets (40 mg total) by mouth daily for 5 days

## 2025-05-07 NOTE — ASSESSMENT & PLAN NOTE
Patient was started on mood stabilizer during her last visit (last week).  Prior to that she had been off of medication for the past 2 to 3 weeks.  During our last visit she was in a likely manic state however no indication for voluntary/involuntary hospitalization.  She was not a danger to herself or others.  Today the patient endorses better sleeping, does not have pressured speech,and is easily redirectable.   Structured to start Risperdal 0.5 mg daily for 5 days, 1 mg daily for 5 days, and then followed by 2 mg daily for 5 days.  States that this has been improving her sleep as well as her symptoms.      Plan:  Continue Risperdal 2 mg daily.  Follow-up in 1 week.     Orders:    risperiDONE (RisperDAL) 2 mg tablet; Take 1 tablet (2 mg total) by mouth daily at bedtime

## 2025-05-07 NOTE — ASSESSMENT & PLAN NOTE
Patient states that she feels anxious, however never picked up her medication given to her at last visit.  States that it was not available at the pharmacy.    Plan:  Courtesy refill, Atarax represcribed for the patient.  Patient instructed if she has trouble picking up her medication she should reach out to the office.  Orders:    hydrOXYzine HCL (ATARAX) 25 mg tablet; Take 1 tablet (25 mg total) by mouth every 8 (eight) hours as needed for anxiety

## 2025-05-09 LAB — T PALLIDUM AB SER QL AGGL: REACTIVE

## 2025-05-13 ENCOUNTER — TELEPHONE (OUTPATIENT)
Dept: FAMILY MEDICINE CLINIC | Facility: CLINIC | Age: 55
End: 2025-05-13

## 2025-05-13 ENCOUNTER — RESULTS FOLLOW-UP (OUTPATIENT)
Dept: FAMILY MEDICINE CLINIC | Facility: CLINIC | Age: 55
End: 2025-05-13

## 2025-05-13 NOTE — TELEPHONE ENCOUNTER
Pt left a vm to reschedule her 11am appt.       Called pt and rescheduled appt with Dr Daigle for 6/17/25 per pt request

## 2025-05-13 NOTE — TELEPHONE ENCOUNTER
I called the patient in regards to Marline request to call in regards to her recent labs, I called to let her know that her syphilis was positive and the provider wanted to know if she has been treated in the past, pt stated she will f/u with her pcp next month, I asked her again if she has been treated and she hung up on me.

## 2025-05-19 DIAGNOSIS — J45.20 MILD INTERMITTENT ASTHMA, UNSPECIFIED WHETHER COMPLICATED: Primary | ICD-10-CM

## 2025-05-19 DIAGNOSIS — G89.29 CHRONIC BILATERAL LOW BACK PAIN WITHOUT SCIATICA: ICD-10-CM

## 2025-05-19 DIAGNOSIS — M54.50 CHRONIC BILATERAL LOW BACK PAIN WITHOUT SCIATICA: ICD-10-CM

## 2025-05-19 RX ORDER — LIDOCAINE 50 MG/G
1 PATCH TOPICAL DAILY
Qty: 30 PATCH | Refills: 2 | Status: SHIPPED | OUTPATIENT
Start: 2025-05-19

## 2025-05-19 RX ORDER — GABAPENTIN 300 MG/1
300 CAPSULE ORAL 3 TIMES DAILY
Qty: 90 CAPSULE | Refills: 1 | Status: SHIPPED | OUTPATIENT
Start: 2025-05-19

## 2025-05-19 RX ORDER — ALBUTEROL SULFATE 90 UG/1
2 INHALANT RESPIRATORY (INHALATION) EVERY 4 HOURS PRN
Qty: 18 G | Refills: 2 | Status: SHIPPED | OUTPATIENT
Start: 2025-05-19

## 2025-06-02 ENCOUNTER — HOSPITAL ENCOUNTER (EMERGENCY)
Facility: HOSPITAL | Age: 55
Discharge: HOME | End: 2025-06-02
Attending: EMERGENCY MEDICINE | Admitting: EMERGENCY MEDICINE
Payer: COMMERCIAL

## 2025-06-02 ENCOUNTER — APPOINTMENT (EMERGENCY)
Dept: CT IMAGING | Facility: HOSPITAL | Age: 55
End: 2025-06-02
Payer: COMMERCIAL

## 2025-06-02 VITALS
TEMPERATURE: 98.7 F | DIASTOLIC BLOOD PRESSURE: 87 MMHG | BODY MASS INDEX: 20.6 KG/M2 | RESPIRATION RATE: 16 BRPM | OXYGEN SATURATION: 98 % | SYSTOLIC BLOOD PRESSURE: 132 MMHG | WEIGHT: 112.66 LBS | HEART RATE: 99 BPM
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
## 2025-06-02 DIAGNOSIS — M51.26 LUMBAR HERNIATED DISC: ICD-10-CM

## 2025-06-02 DIAGNOSIS — M54.9 BACK PAIN: ICD-10-CM

## 2025-06-02 DIAGNOSIS — S00.83XA CONTUSION OF FACE, INITIAL ENCOUNTER: ICD-10-CM

## 2025-06-02 PROCEDURE — 72125 CT NECK SPINE W/O DYE: CPT

## 2025-06-02 PROCEDURE — 72131 CT LUMBAR SPINE W/O DYE: CPT

## 2025-06-02 PROCEDURE — 70486 CT MAXILLOFACIAL W/O DYE: CPT

## 2025-06-02 PROCEDURE — 72128 CT CHEST SPINE W/O DYE: CPT

## 2025-06-02 PROCEDURE — 99284 EMERGENCY DEPT VISIT MOD MDM: CPT

## 2025-06-02 PROCEDURE — 96372 THER/PROPH/DIAG INJ SC/IM: CPT

## 2025-06-02 PROCEDURE — 70450 CT HEAD/BRAIN W/O DYE: CPT

## 2025-06-02 PROCEDURE — 99285 EMERGENCY DEPT VISIT HI MDM: CPT | Performed by: EMERGENCY MEDICINE

## 2025-06-02 PROCEDURE — 90715 TDAP VACCINE 7 YRS/> IM: CPT | Performed by: EMERGENCY MEDICINE

## 2025-06-02 RX ORDER — ACETAMINOPHEN 500 MG
500 TABLET ORAL EVERY 6 HOURS PRN
Qty: 30 TABLET | Refills: 0 | Status: SHIPPED | OUTPATIENT
Start: 2025-06-02

## 2025-06-02 RX ORDER — ACETAMINOPHEN 325 MG/1
650 TABLET ORAL ONCE
Status: COMPLETED | OUTPATIENT
Start: 2025-06-02 | End: 2025-06-02

## 2025-06-02 RX ORDER — DIAZEPAM 5 MG/1
5 TABLET ORAL ONCE
Status: COMPLETED | OUTPATIENT
Start: 2025-06-02 | End: 2025-06-02

## 2025-06-02 RX ORDER — LIDOCAINE 50 MG/G
1 PATCH TOPICAL ONCE
Status: DISCONTINUED | OUTPATIENT
Start: 2025-06-02 | End: 2025-06-02 | Stop reason: HOSPADM

## 2025-06-02 RX ORDER — NAPROXEN 500 MG/1
500 TABLET ORAL 2 TIMES DAILY WITH MEALS
Qty: 30 TABLET | Refills: 0 | Status: SHIPPED | OUTPATIENT
Start: 2025-06-02

## 2025-06-02 RX ORDER — IBUPROFEN 600 MG/1
600 TABLET, FILM COATED ORAL ONCE
Status: COMPLETED | OUTPATIENT
Start: 2025-06-02 | End: 2025-06-02

## 2025-06-02 RX ADMIN — TETANUS TOXOID, REDUCED DIPHTHERIA TOXOID AND ACELLULAR PERTUSSIS VACCINE, ADSORBED 0.5 ML: 5; 2.5; 8; 8; 2.5 SUSPENSION INTRAMUSCULAR at 20:00

## 2025-06-02 RX ADMIN — ACETAMINOPHEN 650 MG: 325 TABLET ORAL at 20:00

## 2025-06-02 RX ADMIN — DIAZEPAM 5 MG: 5 TABLET ORAL at 20:31

## 2025-06-02 RX ADMIN — IBUPROFEN 600 MG: 600 TABLET ORAL at 20:00

## 2025-06-02 RX ADMIN — LIDOCAINE 1 PATCH: 50 PATCH CUTANEOUS at 20:31
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
## 2025-06-03 NOTE — DISCHARGE INSTRUCTIONS
"Patient Education     Head injury in adults   The Basics   Written by the doctors and editors at [facility]   What causes head injuries? -- A head injury can happen when a person hits their head on a hard surface or is hit in the head with something. The most common causes of head injury are falls, sports injuries, and car and bike accidents.  Some head injuries are minor, like a bump on the head. With minor head injuries, the skull protects the brain from damage. Others can be more serious and cause brain injury. A \"concussion\" is the medical term for a mild brain injury. Sometimes, head injuries can be serious or life-threatening.  A more serious head injury can cause:   Broken bone of the skull or face (figure 1)   Brain injury or swelling   Bleeding in or around the brain  This article discusses head injuries in people 18 years and older. Head injuries in children might be managed differently.  What are the symptoms of a head injury? -- Symptoms depend on the type of injury a person has and how severe it is. People with a minor head injury, such as a bump on the head, might not have any symptoms.  Some people pass out or lose consciousness when they get a head injury. If a person does not wake up quickly, or blacks out several minutes or hours after a head injury, they might have bleeding in the brain. The person needs emergency help.  Other symptoms that can happen after a head injury include:   Headache   Nausea or vomiting   Swelling, bleeding, or bruising on the scalp   Dizziness   Confusion or memory problems   Vision problems   Feeling tired or sleepy   Mood or behavior changes, or not acting like yourself   Trouble walking or talking   Seizures - Seizures are waves of abnormal electrical activity in the brain. They can make you pass out, or move or behave strangely.  A head injury that involves a broken skull or face bone can also cause:   Bruising around the eyes or behind the ear   Blood or clear fluid " draining from the nose or ear  Symptoms can start right after a head injury, or a few hours or days later.  Some people have symptoms that last a short time only. Other people have symptoms that cause long-lasting problems.  Will I need tests? -- It depends on your injury and symptoms. Your doctor or nurse will ask about your symptoms and do an exam. They will also ask questions to find out if you are able to think clearly.  If your doctor or nurse thinks that you might have a serious injury, they might order an imaging test of your brain. This might be a CT or MRI scan. These tests create pictures of your skull and brain.  How are head injuries treated? -- Treatment depends on your injury and how serious it is.  Usually, minor head injuries do not need treatment. But your doctor might recommend things like:   Having someone stay with you for 24 hours after your injury - This person should watch for new symptoms or the symptoms listed above. They should also make sure that you can wake up at a normal time after you fall asleep. It is not usually necessary to wake you up during the night.   Taking over-the-counter pain medicines - Acetaminophen (sample brand name: Tylenol) might help relieve a headache.   Rest - It can be important to rest if you have symptoms after a concussion. This means resting your body and avoiding activities that make you feel worse. It can also help to rest your brain. Avoid looking at screens until you are feeling better.   Ice - If you bumped your head, ice can help with pain and swelling. Apply a cold gel pack, bag of ice, or bag of frozen vegetables on the area every 1 to 2 hours, for 15 minutes each time. Put a thin towel between the ice (or other cold object) and your head. Use the ice (or other cold object) for at least 6 hours after your injury.  Severe head injuries need to be treated in the hospital. In this case, treatment can include:   Medicines - Different medicines can help  prevent brain swelling, bleeding in the brain, or seizures.   Surgery - If you have bleeding in or around your brain, or if your brain swells, you might need surgery.  When should I call for help? -- If you had a head injury, there are certain problems that you or your caregiver should watch for.  Someone should call for an ambulance (in the [place] and [place], call 9-1-1) if you:   Cannot be fully woken up   Are acting confused or disoriented   Have a sudden and persistent change in your behavior   Cannot walk normally   Have trouble speaking or slurred speech   Have severe weakness or cannot move an arm, leg, or 1 side of your face   Have a seizure, or jerking of your arms or legs you cannot control  Call the doctor or nurse for advice if you:   Have trouble concentrating, thinking clearly, or remembering things   Have trouble waking from sleep or staying awake   Have nausea or vomiting that is not improving   Have blurry eyesight, double vision, or other problems seeing   Have blood or clear liquid draining from your ears or nose   Feel dizzy or faint   Seem weak or have numbness in an arm, leg, or other body part   Have a stiff neck   Have a headache that is severe, gets worse, feels different, or does not get better with over-the-counter medicines  If any of the above symptoms seem severe, or if you are concerned but cannot reach the doctor or nurse, seek emergency help. These things don't always mean there is a serious problem, but seeing a doctor or nurse is the only way to know for sure.  When can I play sports or do my usual activities again? -- Ask your doctor when you can play sports or do your usual activities again. It depends on your injury and symptoms.  How can head injuries be prevented? -- To help prevent another head injury, you should wear a helmet when you ride a bike or motorcycle, or when you play sports where you could hit your head. You should also wear a seat belt every time you drive or ride in  a car.  All topics are updated as new evidence becomes available and our peer review process is complete.  This topic retrieved from [employer] on: Mar 06, 2024.  Topic 59927 Version 13.0  Release: 32.2.4 - C32.64  © 2024 UpToDate, Inc. and/or its affiliates. All rights reserved.  figure 1: Bones of the skull and face     Graphic 18445 Version 2.0  Consumer Information Use and Disclaimer   Disclaimer: This generalized information is a limited summary of diagnosis, treatment, and/or medication information. It is not meant to be comprehensive and should be used as a tool to help the user understand and/or assess potential diagnostic and treatment options. It does NOT include all information about conditions, treatments, medications, side effects, or risks that may apply to a specific patient. It is not intended to be medical advice or a substitute for the medical advice, diagnosis, or treatment of a health care provider based on the health care provider's examination and assessment of a patient's specific and unique circumstances. Patients must speak with a health care provider for complete information about their health, medical questions, and treatment options, including any risks or benefits regarding use of medications. This information does not endorse any treatments or medications as safe, effective, or approved for treating a specific patient. UpToDate, Inc. and its affiliates disclaim any warranty or liability relating to this information or the use thereof.The use of this information is governed by the Terms of Use, available at https://www.wolterskluwer.com/en/know/clinical-effectiveness-terms. 2024© UpToDate, Inc. and its affiliates and/or licensors. All rights reserved.  Copyright   © 2024 UpToDate, Inc. and/or its affiliates. All rights reserved.